# Patient Record
Sex: FEMALE | Race: WHITE | NOT HISPANIC OR LATINO | Employment: OTHER | URBAN - METROPOLITAN AREA
[De-identification: names, ages, dates, MRNs, and addresses within clinical notes are randomized per-mention and may not be internally consistent; named-entity substitution may affect disease eponyms.]

---

## 2017-12-08 ENCOUNTER — EST. PATIENT EMERGENCY (OUTPATIENT)
Dept: URBAN - METROPOLITAN AREA CLINIC 43 | Facility: CLINIC | Age: 62
End: 2017-12-08

## 2017-12-08 DIAGNOSIS — H04.123: ICD-10-CM

## 2017-12-08 DIAGNOSIS — H00.026: ICD-10-CM

## 2017-12-08 DIAGNOSIS — H00.023: ICD-10-CM

## 2017-12-08 PROCEDURE — 92012 INTRM OPH EXAM EST PATIENT: CPT

## 2017-12-08 ASSESSMENT — VISUAL ACUITY
OS_CC: 20/20
OD_CC: 20/20

## 2018-03-28 ENCOUNTER — ESTABLISHED COMPREHENSIVE EXAM (OUTPATIENT)
Dept: URBAN - METROPOLITAN AREA CLINIC 43 | Facility: CLINIC | Age: 63
End: 2018-03-28

## 2018-03-28 DIAGNOSIS — H00.023: ICD-10-CM

## 2018-03-28 DIAGNOSIS — H25.13: ICD-10-CM

## 2018-03-28 DIAGNOSIS — H00.026: ICD-10-CM

## 2018-03-28 DIAGNOSIS — H04.123: ICD-10-CM

## 2018-03-28 DIAGNOSIS — H43.813: ICD-10-CM

## 2018-03-28 PROCEDURE — 92015 DETERMINE REFRACTIVE STATE: CPT

## 2018-03-28 PROCEDURE — 92014 COMPRE OPH EXAM EST PT 1/>: CPT

## 2018-03-28 ASSESSMENT — VISUAL ACUITY
OD_CC: 20/20-1
OD_CC: J1
OS_SC: CF 6FT
OS_CC: 20/25+2
OS_CC: J1
OD_SC: 20/70+1

## 2018-03-28 ASSESSMENT — TONOMETRY
OD_IOP_MMHG: 12
OS_IOP_MMHG: 14

## 2019-08-21 ENCOUNTER — ESTABLISHED COMPREHENSIVE EXAM (OUTPATIENT)
Dept: URBAN - METROPOLITAN AREA CLINIC 43 | Facility: CLINIC | Age: 64
End: 2019-08-21

## 2019-08-21 DIAGNOSIS — H43.813: ICD-10-CM

## 2019-08-21 DIAGNOSIS — H25.9: ICD-10-CM

## 2019-08-21 DIAGNOSIS — H52.13: ICD-10-CM

## 2019-08-21 DIAGNOSIS — H04.123: ICD-10-CM

## 2019-08-21 DIAGNOSIS — H02.883: ICD-10-CM

## 2019-08-21 DIAGNOSIS — H02.886: ICD-10-CM

## 2019-08-21 PROCEDURE — 92014 COMPRE OPH EXAM EST PT 1/>: CPT

## 2019-08-21 PROCEDURE — 92015 DETERMINE REFRACTIVE STATE: CPT

## 2019-08-21 ASSESSMENT — VISUAL ACUITY
OS_CC: J1
OS_SC: 20/200
OD_CC: J1
OD_CC: 20/20
OS_SC: J1
OD_SC: J4
OD_SC: 20/70
OD_PH: 20/30
OS_PH: 20/60
OS_CC: 20/20-1

## 2019-08-21 ASSESSMENT — TONOMETRY
OS_IOP_MMHG: 13
OD_IOP_MMHG: 13

## 2020-10-07 ENCOUNTER — ESTABLISHED COMPREHENSIVE EXAM (OUTPATIENT)
Dept: URBAN - METROPOLITAN AREA CLINIC 43 | Facility: CLINIC | Age: 65
End: 2020-10-07

## 2020-10-07 DIAGNOSIS — H04.123: ICD-10-CM

## 2020-10-07 DIAGNOSIS — H02.886: ICD-10-CM

## 2020-10-07 DIAGNOSIS — H43.813: ICD-10-CM

## 2020-10-07 DIAGNOSIS — H52.13: ICD-10-CM

## 2020-10-07 DIAGNOSIS — H02.883: ICD-10-CM

## 2020-10-07 DIAGNOSIS — H25.9: ICD-10-CM

## 2020-10-07 PROCEDURE — 92015 DETERMINE REFRACTIVE STATE: CPT

## 2020-10-07 PROCEDURE — 92014 COMPRE OPH EXAM EST PT 1/>: CPT

## 2020-10-07 ASSESSMENT — VISUAL ACUITY
OS_SC: 20/400
OD_SC: J3
OS_CC: 20/25-2
OS_SC: J1-
OD_SC: 20/60
OD_CC: 20/20-
OD_CC: J1
OS_CC: J1

## 2020-10-07 ASSESSMENT — TONOMETRY
OD_IOP_MMHG: 10
OS_IOP_MMHG: 14

## 2021-11-11 ENCOUNTER — ESTABLISHED COMPREHENSIVE EXAM (OUTPATIENT)
Dept: URBAN - METROPOLITAN AREA CLINIC 43 | Facility: CLINIC | Age: 66
End: 2021-11-11

## 2021-11-11 DIAGNOSIS — H02.883: ICD-10-CM

## 2021-11-11 DIAGNOSIS — H52.13: ICD-10-CM

## 2021-11-11 DIAGNOSIS — H04.123: ICD-10-CM

## 2021-11-11 DIAGNOSIS — H25.9: ICD-10-CM

## 2021-11-11 DIAGNOSIS — H43.813: ICD-10-CM

## 2021-11-11 DIAGNOSIS — H02.886: ICD-10-CM

## 2021-11-11 PROCEDURE — 92015 DETERMINE REFRACTIVE STATE: CPT

## 2021-11-11 PROCEDURE — 92014 COMPRE OPH EXAM EST PT 1/>: CPT

## 2021-11-11 ASSESSMENT — VISUAL ACUITY
OD_CC: J1
OS_CC: J1-
OS_CC: 20/25
OD_SC: 20/300
OD_CC: 20/20-1
OD_SC: J6-
OS_SC: J2
OS_SC: 20/300

## 2021-11-11 ASSESSMENT — TONOMETRY
OD_IOP_MMHG: 13
OS_IOP_MMHG: 13

## 2022-05-13 ENCOUNTER — FOLLOW UP (OUTPATIENT)
Dept: URBAN - METROPOLITAN AREA CLINIC 43 | Facility: CLINIC | Age: 67
End: 2022-05-13

## 2022-05-13 DIAGNOSIS — H02.883: ICD-10-CM

## 2022-05-13 DIAGNOSIS — H02.886: ICD-10-CM

## 2022-05-13 DIAGNOSIS — H04.123: ICD-10-CM

## 2022-05-13 PROCEDURE — 92012 INTRM OPH EXAM EST PATIENT: CPT

## 2022-05-13 ASSESSMENT — VISUAL ACUITY
OD_CC: J1
OS_CC: J1
OD_CC: 20/20-1
OD_SC: 20/100
OS_SC: 20/400
OS_CC: 20/25+2

## 2022-06-27 ENCOUNTER — EMERGENCY VISIT (OUTPATIENT)
Dept: URBAN - METROPOLITAN AREA CLINIC 43 | Facility: CLINIC | Age: 67
End: 2022-06-27

## 2022-06-27 DIAGNOSIS — H04.123: ICD-10-CM

## 2022-06-27 DIAGNOSIS — H02.883: ICD-10-CM

## 2022-06-27 DIAGNOSIS — H02.886: ICD-10-CM

## 2022-06-27 PROCEDURE — 92012 INTRM OPH EXAM EST PATIENT: CPT

## 2022-06-27 RX ORDER — NEOMYCIN SULFATE, POLYMYXIN B SULFATE AND DEXAMETHASONE 3.5; 10000; 1 MG/G; [USP'U]/G; MG/G: OINTMENT OPHTHALMIC EVERY EVENING

## 2022-06-27 ASSESSMENT — TONOMETRY
OS_IOP_MMHG: 17
OD_IOP_MMHG: 13

## 2022-06-27 ASSESSMENT — VISUAL ACUITY
OD_CC: 20/25-2
OS_CC: 20/25+2

## 2023-01-30 ENCOUNTER — COMPREHENSIVE EXAM (OUTPATIENT)
Dept: URBAN - METROPOLITAN AREA CLINIC 43 | Facility: CLINIC | Age: 68
End: 2023-01-30

## 2023-01-30 DIAGNOSIS — H02.886: ICD-10-CM

## 2023-01-30 DIAGNOSIS — H43.813: ICD-10-CM

## 2023-01-30 DIAGNOSIS — H52.13: ICD-10-CM

## 2023-01-30 DIAGNOSIS — H02.883: ICD-10-CM

## 2023-01-30 DIAGNOSIS — H25.9: ICD-10-CM

## 2023-01-30 DIAGNOSIS — H04.123: ICD-10-CM

## 2023-01-30 PROCEDURE — 92014 COMPRE OPH EXAM EST PT 1/>: CPT

## 2023-01-30 PROCEDURE — 92015 DETERMINE REFRACTIVE STATE: CPT

## 2023-01-30 ASSESSMENT — VISUAL ACUITY
OS_SC: 20/400
OD_SC: J2
OD_CC: 20/40
OD_SC: 20/200
OS_CC: 20/40-1
OD_CC: J1
OS_SC: J1
OS_CC: J1

## 2023-01-30 ASSESSMENT — TONOMETRY
OD_IOP_MMHG: 10
OS_IOP_MMHG: 10

## 2024-02-19 ENCOUNTER — OFFICE VISIT (OUTPATIENT)
Dept: PRIMARY CARE CLINIC | Facility: CLINIC | Age: 69
End: 2024-02-19
Payer: MEDICARE

## 2024-02-19 VITALS
TEMPERATURE: 97.7 F | SYSTOLIC BLOOD PRESSURE: 142 MMHG | BODY MASS INDEX: 29.41 KG/M2 | WEIGHT: 183 LBS | HEIGHT: 66 IN | DIASTOLIC BLOOD PRESSURE: 78 MMHG | HEART RATE: 78 BPM | OXYGEN SATURATION: 98 %

## 2024-02-19 DIAGNOSIS — I65.21 CAROTID ATHEROSCLEROSIS, RIGHT: ICD-10-CM

## 2024-02-19 DIAGNOSIS — E78.5 HYPERLIPIDEMIA, UNSPECIFIED HYPERLIPIDEMIA TYPE: ICD-10-CM

## 2024-02-19 DIAGNOSIS — Z76.89 ENCOUNTER TO ESTABLISH CARE WITH NEW DOCTOR: Primary | ICD-10-CM

## 2024-02-19 DIAGNOSIS — F41.8 DEPRESSION WITH ANXIETY: ICD-10-CM

## 2024-02-19 DIAGNOSIS — E03.9 HYPOTHYROIDISM, UNSPECIFIED TYPE: ICD-10-CM

## 2024-02-19 DIAGNOSIS — I25.10 CORONARY ARTERY DISEASE INVOLVING NATIVE CORONARY ARTERY OF NATIVE HEART WITHOUT ANGINA PECTORIS: ICD-10-CM

## 2024-02-19 DIAGNOSIS — K20.90 ESOPHAGITIS: ICD-10-CM

## 2024-02-19 DIAGNOSIS — M70.72 BURSITIS OF BOTH HIPS, UNSPECIFIED BURSA: ICD-10-CM

## 2024-02-19 DIAGNOSIS — M77.8 TENDINITIS OF SHOULDER, UNSPECIFIED LATERALITY: ICD-10-CM

## 2024-02-19 DIAGNOSIS — M70.71 BURSITIS OF BOTH HIPS, UNSPECIFIED BURSA: ICD-10-CM

## 2024-02-19 PROBLEM — M54.50 LUMBAGO: Status: ACTIVE | Noted: 2019-07-16

## 2024-02-19 PROBLEM — R91.8 MULTIPLE PULMONARY NODULES DETERMINED BY COMPUTED TOMOGRAPHY OF LUNG: Status: ACTIVE | Noted: 2024-02-19

## 2024-02-19 PROBLEM — M54.2 CERVICALGIA: Status: ACTIVE | Noted: 2019-07-16

## 2024-02-19 PROBLEM — F41.9 ANXIETY: Status: ACTIVE | Noted: 2024-02-19

## 2024-02-19 PROBLEM — G47.30 SLEEP APNEA: Status: ACTIVE | Noted: 2024-02-19

## 2024-02-19 PROBLEM — I65.29 CAROTID ATHEROSCLEROSIS: Status: ACTIVE | Noted: 2019-02-27

## 2024-02-19 PROCEDURE — G8427 DOCREV CUR MEDS BY ELIG CLIN: HCPCS | Performed by: FAMILY MEDICINE

## 2024-02-19 PROCEDURE — 1123F ACP DISCUSS/DSCN MKR DOCD: CPT | Performed by: FAMILY MEDICINE

## 2024-02-19 PROCEDURE — G8419 CALC BMI OUT NRM PARAM NOF/U: HCPCS | Performed by: FAMILY MEDICINE

## 2024-02-19 PROCEDURE — G8484 FLU IMMUNIZE NO ADMIN: HCPCS | Performed by: FAMILY MEDICINE

## 2024-02-19 PROCEDURE — 3017F COLORECTAL CA SCREEN DOC REV: CPT | Performed by: FAMILY MEDICINE

## 2024-02-19 PROCEDURE — 1090F PRES/ABSN URINE INCON ASSESS: CPT | Performed by: FAMILY MEDICINE

## 2024-02-19 PROCEDURE — 1036F TOBACCO NON-USER: CPT | Performed by: FAMILY MEDICINE

## 2024-02-19 PROCEDURE — G8400 PT W/DXA NO RESULTS DOC: HCPCS | Performed by: FAMILY MEDICINE

## 2024-02-19 PROCEDURE — 99204 OFFICE O/P NEW MOD 45 MIN: CPT | Performed by: FAMILY MEDICINE

## 2024-02-19 RX ORDER — EPINEPHRINE 0.3 MG/.3ML
INJECTION SUBCUTANEOUS
COMMUNITY
Start: 2024-02-09

## 2024-02-19 RX ORDER — FAMOTIDINE 10 MG
TABLET ORAL
COMMUNITY

## 2024-02-19 RX ORDER — ALPRAZOLAM 0.25 MG/1
TABLET ORAL
COMMUNITY
Start: 2023-11-27

## 2024-02-19 RX ORDER — PRAVASTATIN SODIUM 10 MG
TABLET ORAL
COMMUNITY
Start: 2024-01-15

## 2024-02-19 RX ORDER — LEVOTHYROXINE SODIUM 88 UG/1
88 TABLET ORAL DAILY
COMMUNITY
Start: 2024-01-19

## 2024-02-19 RX ORDER — VITAMIN B COMPLEX
TABLET ORAL
COMMUNITY

## 2024-02-19 ASSESSMENT — PATIENT HEALTH QUESTIONNAIRE - PHQ9
2. FEELING DOWN, DEPRESSED OR HOPELESS: 0
SUM OF ALL RESPONSES TO PHQ QUESTIONS 1-9: 0
1. LITTLE INTEREST OR PLEASURE IN DOING THINGS: 0
SUM OF ALL RESPONSES TO PHQ9 QUESTIONS 1 & 2: 0

## 2024-02-19 ASSESSMENT — ENCOUNTER SYMPTOMS
COUGH: 0
NAUSEA: 0
SHORTNESS OF BREATH: 0
DIARRHEA: 0
ABDOMINAL PAIN: 0
VOMITING: 0

## 2024-02-19 NOTE — PROGRESS NOTES
review records, will refer to Cardiology and repeat labs in 3 months.         Relevant Medications    EPINEPHrine (EPIPEN) 0.3 MG/0.3ML SOAJ injection    pravastatin (PRAVACHOL) 10 MG tablet    Other Relevant Orders    [facility] [place]        The diagnoses and plan were discussed with the patient, who verbalizes understanding and agrees with plan.  All questions answered.    Chief Complaint    Chief Complaint   Patient presents with    New Patient     Establishing care. Patient has just relocated from [place].        HISTORY OF PRESENT ILLNESS    68 y.o. female presents today to establish care with a new primary care provider. Just relocated from [place], [place] 3 weeks ago.  Has had multiple issues with joint pains, sciatica, back issues and bursitis in her hips.  Had injections in both hips before moving here but notes that it has not completely resolved.  Does feel better than before.  Was starting PT at the same time.  Was also doing PT for tendinitis in both shoulders. Got bronchitis a couple of years ago and was sick for seven weeks.  States that she just restarted Pravastatin about a month ago after failing multiple other medications, including injections.  Has 20 mg and 10 mg, states that she alternates the doses due to muscle aches. Has a history of constipation requiring significant Miralax use, states that she has been using it daily but does not think it is working well right now, dexter snot feel like she is clearing out her bowels.  States that she is eating prunes and cut back on her alcohol and carbonation due to a history of reflux.  Has been eating healthier and walking more, a couple of days per week.    PAST MEDICAL HISTORY    Past Medical History:   Diagnosis Date    Bursitis of both hips     CAD (coronary artery disease)     Chronic back pain     Hyperlipidemia     Hypothyroidism     Muscle spasm     Neuropathy     Sciatic nerve disease, unspecified laterality     R>L

## 2024-02-19 NOTE — PATIENT INSTRUCTIONS
IT WAS GREAT TO SEE YOU TODAY!    I HAVE REFERRED YOU TO ORTHOPEDICS, PHYSICAL THERAPY AND CARDIOLOGY.  THEY WILL CALL YOU TO SET UP THESE APPOINTMENTS.     WE WILL DO LABS WHEN YOU RETURN IN 3 MONTHS SO PLEASE COME FASTING (NOTHING TO EAT OR DRINK AFTER MIDNIGHT THE NIGHT BEFORE EXCEPT YOUR MEDS AND WATER).    PLEASE TAKE ALL MEDICATION AS DISCUSSED.    ~LOOK FOR BERBERINE AND MAGNESIUM TO TAKE.  THESE ARE SUPPLEMENTS YOU CAN TAKE AT NIGHT TO HELP WITH CONSTIPATION.  USUALLY I RECOMMEND AROUND 100-200 MG OF MAGNESIUM AT NIGHT.  LOOK FOR 1000 MG OF BERBERINE TO HELP WITH BOTH YOUR CHOLESTEROL AND WITH CONSTIPATION.    I WILL SEE YOU AGAIN IN 3 MONTHS BUT PLEASE CALL WITH CONCERNS [phone]

## 2024-02-19 NOTE — ASSESSMENT & PLAN NOTE
Alternate 10 mg and 20 mg of Pravastatin due to significant side effects from multiple medications in the past.  Will review records, will refer to Cardiology and repeat labs in 3 months.

## 2024-02-26 ENCOUNTER — HOSPITAL ENCOUNTER (OUTPATIENT)
Dept: PHYSICAL THERAPY | Age: 69
Setting detail: RECURRING SERIES
Discharge: HOME | End: 2024-02-29
Attending: FAMILY MEDICINE
Payer: MEDICARE

## 2024-02-26 DIAGNOSIS — M25.512 LEFT SHOULDER PAIN, UNSPECIFIED CHRONICITY: ICD-10-CM

## 2024-02-26 DIAGNOSIS — M25.511 RIGHT SHOULDER PAIN, UNSPECIFIED CHRONICITY: Primary | ICD-10-CM

## 2024-02-26 PROCEDURE — 97035 APP MDLTY 1+ULTRASOUND EA 15: CPT

## 2024-02-26 PROCEDURE — 97140 MANUAL THERAPY 1/> REGIONS: CPT

## 2024-02-26 PROCEDURE — 97161 PT EVAL LOW COMPLEX 20 MIN: CPT

## 2024-02-26 ASSESSMENT — PAIN DESCRIPTION - DESCRIPTORS: DESCRIPTORS: ACHING;SHARP

## 2024-02-26 ASSESSMENT — PAIN SCALES - GENERAL: PAINLEVEL_OUTOF10: 7

## 2024-02-26 ASSESSMENT — PAIN DESCRIPTION - LOCATION: LOCATION: SHOULDER

## 2024-02-26 ASSESSMENT — PAIN DESCRIPTION - PAIN TYPE: TYPE: CHRONIC PAIN

## 2024-02-26 ASSESSMENT — PAIN DESCRIPTION - ORIENTATION: ORIENTATION: RIGHT

## 2024-02-26 NOTE — PROGRESS NOTES
Candi Becerra  : 1955  Primary: Medicare Part A And B (Medicare)  Secondary: [employer] HE[employer] MEDICARE SUPP [facility] @ [address]  [facility] [address]  Phone: [phone]  Fax: [phone] Plan Frequency: 2 times a week for 90 days    Plan of Care/Certification Expiration Date: 24        Plan of Care/Certification Expiration Date:  Plan of Care/Certification Expiration Date: 24    Frequency/Duration:   Plan Frequency: 2 times a week for 90 days      Time In/Out:   Time In: 1348  Time Out: 1430      PT Visit Info:    Progress Note Due Date: 24  Total # of Visits to Date: 1  Progress Note Counter: 1      Visit Count:  1    OUTPATIENT PHYSICAL THERAPY:   Treatment Note 2024       Episode  (PT- Bursitis of both hips/tendinitis of shoulder)               Treatment Diagnosis:    Right shoulder pain, unspecified chronicity  Left shoulder pain, unspecified chronicity  Medical/Referring Diagnosis:    Bursitis of both hips, unspecified bursa  Tendinitis of shoulder, unspecified laterality  Referring Physician:  Lata Evans DO MD Orders:  PT Eval and Treat   Return MD Appt:  Unknown  Date of Onset:  Onset Date:  (Chronic)   Allergies:   Iodinated contrast media and Statins  Restrictions/Precautions:   None      Interventions Planned (Treatment may consist of any combination of the following):     See Assessment Note    Subjective Comments:   Patient complains of right shoulder pain.  Initial Pain Level:: Right Shoulder 7/10  Post Session Pain Level:  Right  Shoulder 7/10  Medications Last Reviewed:  2024  Updated Objective Findings:  See Evaluation Note from today  Treatment   MANUAL THERAPY: (15 minutes):   Joint mobilization was utilized and necessary because of the patient's restricted joint motion and painful spasm. In supine, patient received range of motion/mobilization to right shoulder in all planes.   MODALITIES:

## 2024-02-26 NOTE — THERAPY EVALUATION
degrees with pain, abduction 82 degrees with pain, external rotation 56 degrees with pain.  Patient is able to reach behind her back to mid thoracic level with left upper extremity and mid gluteal level with right upper extremity.  Right shoulder PROM is as follows: flexion 140 degrees with pain, abduction 90 degrees with pain, external rotation 65 degrees with pain.   Sensation:   Bilateral upper extremity within normal limits.   ASSESSMENT   Initial Assessment:  Patient presents with increased pain, decreased strength, and decreased range of motion.  Patient would benefit from skilled physical therapy to address problems and goals.  Thank you for this referral.   Therapy Problem List: (Impacting functional limitations):    Increased Pain, Decreased Strength, Decreased ROM, Decreased [place] with Home Exercise Program, Decreased Posture, Difficulty Sleeping, and Decreased Activity Tolerance/Endurance*   Therapy Prognosis:   Excellent     Initial Assessment Complexity:   Low Complexity       PLAN   Effective Dates: 2/26/2024 TO Plan of Care/Certification Expiration Date: 05/26/24     Frequency/Duration: Plan Frequency: 2 times a week for 90 days      Interventions Planned (Treatment may consist of any combination of the following):    Home Exercise Program (HEP), Manual Therapy, Modalities:,   Heat/Cold,   Ultrasound, and   E-stim - unattended, Range of Motion (ROM), Therapeutic Activites, and Therapeutic Exercise/Strengthening   Goals: (Goals have been discussed and agreed upon with patient.)  Short-Term Functional Goals: Time Frame: 30 days   Patient will be independent with home exercise program to improve strength and decrease pain.  Patient with improve right active shoulder flexion by 20 degrees to improve ease with mobility.   Discharge Goals: Time Frame: 90 days   Patient will score less than or equal to 14 on DASH indicating improvement.  Patient will increase right shoulder strength greater than or

## 2024-03-01 ENCOUNTER — HOSPITAL ENCOUNTER (OUTPATIENT)
Dept: PHYSICAL THERAPY | Age: 69
Setting detail: RECURRING SERIES
Discharge: HOME | End: 2024-03-04
Attending: FAMILY MEDICINE
Payer: MEDICARE

## 2024-03-01 PROCEDURE — 97140 MANUAL THERAPY 1/> REGIONS: CPT

## 2024-03-01 PROCEDURE — 97035 APP MDLTY 1+ULTRASOUND EA 15: CPT

## 2024-03-01 PROCEDURE — 97110 THERAPEUTIC EXERCISES: CPT

## 2024-03-01 ASSESSMENT — PAIN SCALES - GENERAL: PAINLEVEL_OUTOF10: 6

## 2024-03-01 NOTE — PROGRESS NOTES
Candi Becerra  : 1955  Primary: Medicare Part A And B (Medicare)  Secondary: [employer] HE[employer] C[employer] MEDICARE SUPP [facility] @ [address]  [facility] [address]  Phone: [phone]  Fax: [phone] Plan Frequency: 2 times a week for 90 days    Plan of Care/Certification Expiration Date: 24        Plan of Care/Certification Expiration Date:  Plan of Care/Certification Expiration Date: 24    Frequency/Duration:   Plan Frequency: 2 times a week for 90 days      Time In/Out:   Time In: 1515  Time Out: 1600      PT Visit Info:    Progress Note Due Date: 24  Total # of Visits to Date: 2  Progress Note Counter: 2      Visit Count:  2    OUTPATIENT PHYSICAL THERAPY:   Treatment Note 3/1/2024       Episode  (PT- Bursitis of both hips/tendinitis of shoulder)               Treatment Diagnosis:    Right shoulder pain, unspecified chronicity  Left shoulder pain, unspecified chronicity  Medical/Referring Diagnosis:    Bursitis of both hips, unspecified bursa  Tendinitis of shoulder, unspecified laterality  Referring Physician:  Lata Evans DO MD Orders:  PT Eval and Treat   Return MD Appt:  Unknown  Date of Onset:  Onset Date:  (Chronic)   Allergies:   Iodinated contrast media and Statins  Restrictions/Precautions:   None      Interventions Planned (Treatment may consist of any combination of the following):     See Assessment Note    Subjective Comments: Patient reports her shoulder is hurting today.   Initial Pain Level::     6/10  Post Session Pain Level:       6/10  Medications Last Reviewed:  3/1/2024  Updated Objective Findings:  None Today  Treatment   MANUAL THERAPY: (10 minutes):   Joint mobilization was utilized and necessary because of the patient's restricted joint motion and painful spasm. In supine, patient received range of motion/mobilization to right shoulder in all planes.   Trigger point release along bilateral upper

## 2024-03-06 ENCOUNTER — HOSPITAL ENCOUNTER (OUTPATIENT)
Dept: PHYSICAL THERAPY | Age: 69
Setting detail: RECURRING SERIES
Discharge: HOME | End: 2024-03-09
Attending: FAMILY MEDICINE
Payer: MEDICARE

## 2024-03-06 PROCEDURE — 97035 APP MDLTY 1+ULTRASOUND EA 15: CPT

## 2024-03-06 PROCEDURE — 97110 THERAPEUTIC EXERCISES: CPT

## 2024-03-06 PROCEDURE — 97140 MANUAL THERAPY 1/> REGIONS: CPT

## 2024-03-06 ASSESSMENT — PAIN SCALES - GENERAL: PAINLEVEL_OUTOF10: 6

## 2024-03-06 NOTE — PROGRESS NOTES
Candi Becerra  : 1955  Primary: Medicare Part A And B (Medicare)  Secondary: [employer] HE[employer] C[employer] MEDICARE SUPP [facility] @ [address]  [facility] [address]  Phone: [phone]  Fax: [phone] Plan Frequency: 2 times a week for 90 days    Plan of Care/Certification Expiration Date: 24        Plan of Care/Certification Expiration Date:  Plan of Care/Certification Expiration Date: 24    Frequency/Duration:   Plan Frequency: 2 times a week for 90 days      Time In/Out:   Time In: 1430  Time Out: 1515      PT Visit Info:    Progress Note Due Date: 24  Total # of Visits to Date: 2  Progress Note Counter: 2      Visit Count:  3    OUTPATIENT PHYSICAL THERAPY:   Treatment Note 3/6/2024       Episode  (PT- Bursitis of both hips/tendinitis of shoulder)               Treatment Diagnosis:    Right shoulder pain, unspecified chronicity  Left shoulder pain, unspecified chronicity  Medical/Referring Diagnosis:    Bursitis of both hips, unspecified bursa  Tendinitis of shoulder, unspecified laterality  Referring Physician:  Lata Evans DO MD Orders:  PT Eval and Treat   Return MD Appt:  Unknown  Date of Onset:  Onset Date:  (Chronic)   Allergies:   Iodinated contrast media and Statins  Restrictions/Precautions:   None      Interventions Planned (Treatment may consist of any combination of the following):     See Assessment Note    Subjective Comments: \"I took the day off of exercises yesterday to rest\"  Initial Pain Level::     6/10  Post Session Pain Level:       6/10  Medications Last Reviewed:  3/6/2024  Updated Objective Findings:  None Today  Treatment   MANUAL THERAPY: (10 minutes):   Joint mobilization was utilized and necessary because of the patient's restricted joint motion and painful spasm. In supine, patient received range of motion/mobilization to right shoulder in all planes.   Trigger point release along bilateral upper

## 2024-03-08 ENCOUNTER — HOSPITAL ENCOUNTER (OUTPATIENT)
Dept: PHYSICAL THERAPY | Age: 69
Setting detail: RECURRING SERIES
Discharge: HOME | End: 2024-03-11
Attending: FAMILY MEDICINE
Payer: MEDICARE

## 2024-03-08 PROCEDURE — 97035 APP MDLTY 1+ULTRASOUND EA 15: CPT

## 2024-03-08 PROCEDURE — 97140 MANUAL THERAPY 1/> REGIONS: CPT

## 2024-03-08 PROCEDURE — 97110 THERAPEUTIC EXERCISES: CPT

## 2024-03-08 ASSESSMENT — PAIN SCALES - GENERAL: PAINLEVEL_OUTOF10: 6

## 2024-03-08 NOTE — PROGRESS NOTES
Candi Becerra  : 1955  Primary: Medicare Part A And B (Medicare)  Secondary: [employer] HE[employer] C[employer] MEDICARE SUPP [facility] @ [address]  [facility] [address]  Phone: [phone]  Fax: [phone] Plan Frequency: 2 times a week for 90 days    Plan of Care/Certification Expiration Date: 24        Plan of Care/Certification Expiration Date:  Plan of Care/Certification Expiration Date: 24    Frequency/Duration:   Plan Frequency: 2 times a week for 90 days      Time In/Out:   Time In: 1520  Time Out: 1605      PT Visit Info:    Progress Note Due Date: 24  Total # of Visits to Date: 4  Progress Note Counter: 4      Visit Count:  4    OUTPATIENT PHYSICAL THERAPY:   Treatment Note 3/8/2024       Episode  (PT- Bursitis of both hips/tendinitis of shoulder)               Treatment Diagnosis:    Right shoulder pain, unspecified chronicity  Left shoulder pain, unspecified chronicity  Medical/Referring Diagnosis:    Bursitis of both hips, unspecified bursa  Tendinitis of shoulder, unspecified laterality  Referring Physician:  Lata Evans DO MD Orders:  PT Eval and Treat   Return MD Appt:  Unknown  Date of Onset:  Onset Date:  (Chronic)   Allergies:   Iodinated contrast media and Statins  Restrictions/Precautions:   None      Interventions Planned (Treatment may consist of any combination of the following):     See Assessment Note    Subjective Comments: \"The pain is not to bad\".  Initial Pain Level::     6/10  Post Session Pain Level:       6/10  Medications Last Reviewed:  3/8/2024  Updated Objective Findings:  None Today  Treatment   MANUAL THERAPY: (15 minutes):   Joint mobilization was utilized and necessary because of the patient's restricted joint motion and painful spasm. In supine, patient received range of motion/mobilization to right shoulder in all planes.   Trigger point release along bilateral upper traps/bilateral scapular region.

## 2024-03-14 ENCOUNTER — HOSPITAL ENCOUNTER (OUTPATIENT)
Dept: PHYSICAL THERAPY | Age: 69
Setting detail: RECURRING SERIES
Discharge: HOME | End: 2024-03-17
Attending: FAMILY MEDICINE
Payer: MEDICARE

## 2024-03-14 PROCEDURE — 97140 MANUAL THERAPY 1/> REGIONS: CPT

## 2024-03-14 PROCEDURE — 97110 THERAPEUTIC EXERCISES: CPT

## 2024-03-14 PROCEDURE — 97035 APP MDLTY 1+ULTRASOUND EA 15: CPT

## 2024-03-14 ASSESSMENT — PAIN SCALES - GENERAL: PAINLEVEL_OUTOF10: 6

## 2024-03-14 NOTE — PROGRESS NOTES
Candi Brownla  : 1955  Primary: Medicare Part A And B (Medicare)  Secondary: [employer] HE[employer] C[employer] MEDICARE SUPP [facility] @ [address]  [facility] [address]  Phone: [phone]  Fax: [phone] Plan Frequency: 2 times a week for 90 days    Plan of Care/Certification Expiration Date: 24        Plan of Care/Certification Expiration Date:  Plan of Care/Certification Expiration Date: 24    Frequency/Duration:   Plan Frequency: 2 times a week for 90 days      Time In/Out:   Time In: 1345  Time Out: 1430      PT Visit Info:    Progress Note Due Date: 24  Total # of Visits to Date: 5  Progress Note Counter: 5      Visit Count:  5    OUTPATIENT PHYSICAL THERAPY:   Treatment Note 3/14/2024       Episode  (PT- Bursitis of both hips/tendinitis of shoulder)               Treatment Diagnosis:    Right shoulder pain, unspecified chronicity  Left shoulder pain, unspecified chronicity  Medical/Referring Diagnosis:    Bursitis of both hips, unspecified bursa  Tendinitis of shoulder, unspecified laterality  Referring Physician:  Lata Evans DO MD Orders:  PT Eval and Treat   Return MD Appt:  Unknown  Date of Onset:  Onset Date:  (Chronic)   Allergies:   Iodinated contrast media and Statins  Restrictions/Precautions:   None      Interventions Planned (Treatment may consist of any combination of the following):     See Assessment Note    Subjective Comments: Patient reports she spent 13 minutes cutting the grass with a self propel lawnmower and it aggravated both shoulders.  \"I fell going to the bathroom-tripped over something yesterday\".  Initial Pain Level::     6/10  Post Session Pain Level:       6/10  Medications Last Reviewed:  3/14/2024  Updated Objective Findings:  None Today  Treatment   MANUAL THERAPY: (15 minutes):   Joint mobilization was utilized and necessary because of the patient's restricted joint motion and painful spasm. In supine,

## 2024-03-18 ENCOUNTER — INITIAL CONSULT (OUTPATIENT)
Age: 69
End: 2024-03-18
Payer: MEDICARE

## 2024-03-18 VITALS
BODY MASS INDEX: 29.18 KG/M2 | HEART RATE: 62 BPM | SYSTOLIC BLOOD PRESSURE: 122 MMHG | WEIGHT: 180.8 LBS | DIASTOLIC BLOOD PRESSURE: 84 MMHG

## 2024-03-18 DIAGNOSIS — I25.10 CORONARY ARTERY DISEASE INVOLVING NATIVE CORONARY ARTERY OF NATIVE HEART WITHOUT ANGINA PECTORIS: Primary | ICD-10-CM

## 2024-03-18 DIAGNOSIS — Z76.89 ENCOUNTER TO ESTABLISH CARE: ICD-10-CM

## 2024-03-18 PROCEDURE — G8419 CALC BMI OUT NRM PARAM NOF/U: HCPCS | Performed by: INTERNAL MEDICINE

## 2024-03-18 PROCEDURE — 3017F COLORECTAL CA SCREEN DOC REV: CPT | Performed by: INTERNAL MEDICINE

## 2024-03-18 PROCEDURE — G8427 DOCREV CUR MEDS BY ELIG CLIN: HCPCS | Performed by: INTERNAL MEDICINE

## 2024-03-18 PROCEDURE — 1123F ACP DISCUSS/DSCN MKR DOCD: CPT | Performed by: INTERNAL MEDICINE

## 2024-03-18 PROCEDURE — G8484 FLU IMMUNIZE NO ADMIN: HCPCS | Performed by: INTERNAL MEDICINE

## 2024-03-18 PROCEDURE — G8400 PT W/DXA NO RESULTS DOC: HCPCS | Performed by: INTERNAL MEDICINE

## 2024-03-18 PROCEDURE — 1090F PRES/ABSN URINE INCON ASSESS: CPT | Performed by: INTERNAL MEDICINE

## 2024-03-18 PROCEDURE — 99214 OFFICE O/P EST MOD 30 MIN: CPT | Performed by: INTERNAL MEDICINE

## 2024-03-18 PROCEDURE — 93000 ELECTROCARDIOGRAM COMPLETE: CPT | Performed by: INTERNAL MEDICINE

## 2024-03-18 PROCEDURE — 1036F TOBACCO NON-USER: CPT | Performed by: INTERNAL MEDICINE

## 2024-03-18 ASSESSMENT — ENCOUNTER SYMPTOMS
SHORTNESS OF BREATH: 0
ABDOMINAL PAIN: 0

## 2024-03-18 NOTE — PROGRESS NOTES
Cardiovascular:         As per the HPI   Respiratory:  Negative for shortness of breath.    Hematologic/Lymphatic: Does not bruise/bleed easily.   Gastrointestinal:  Negative for abdominal pain.   Genitourinary:  Negative for dysuria.   Neurological:  Negative for focal weakness.   Psychiatric/Behavioral:  Negative for suicidal ideas.           PHYSICAL EXAM:   /84   Pulse 62   Wt 82 kg (180 lb 12.8 oz)   BMI 29.18 kg/m²      Physical Exam  Vitals reviewed.   Constitutional:       Appearance: Normal appearance.      Comments: Appears younger than stated age   HENT:      Head: Normocephalic and atraumatic.   Eyes:      General: No scleral icterus.  Neck:      Vascular: No carotid bruit.   Cardiovascular:      Rate and Rhythm: Normal rate and regular rhythm.      Heart sounds: No murmur heard.     No gallop.   Pulmonary:      Breath sounds: Normal breath sounds.   Abdominal:      Palpations: Abdomen is soft.   Musculoskeletal:         General: No swelling.      Cervical back: Neck supple.   Skin:     General: Skin is warm and dry.   Neurological:      Mental Status: She is alert and oriented to person, place, and time.   Psychiatric:         Mood and Affect: Mood normal.         Medical problems and test results were reviewed with the patient today.     DATA REVIEW    BMP  No results found for: \"NA\", \"K\", \"CL\", \"CO2\", \"BUN\", \"CREATININE\", \"GLUCOSE\", \"CALCIUM\"     LIPIDS  No results found for: \"CHOL\"  No results found for: \"TRIG\"  No results found for: \"HDL\"  No results found for: \"LDLCHOLESTEROL\", \"LDLCALC\"  No results found for: \"VLDL\"  No results found for: \"CHOLHDLRATIO\"    EKG  NSR        OUTSIDE RECORDS REVIEW    Records from outside providers have been reviewed and summarized as noted in the HPI, past history and data review sections of this note, and reviewed with patient. .       ASSESSMENT and PLAN    Candi was seen today for hyperlipidemia, new patient, consultation and establish

## 2024-03-19 ENCOUNTER — HOSPITAL ENCOUNTER (OUTPATIENT)
Dept: PHYSICAL THERAPY | Age: 69
Setting detail: RECURRING SERIES
Discharge: HOME | End: 2024-03-22
Attending: FAMILY MEDICINE
Payer: MEDICARE

## 2024-03-19 PROCEDURE — 97035 APP MDLTY 1+ULTRASOUND EA 15: CPT

## 2024-03-19 PROCEDURE — 97110 THERAPEUTIC EXERCISES: CPT

## 2024-03-19 PROCEDURE — 97140 MANUAL THERAPY 1/> REGIONS: CPT

## 2024-03-19 ASSESSMENT — PAIN SCALES - GENERAL: PAINLEVEL_OUTOF10: 7

## 2024-03-19 NOTE — PROGRESS NOTES
Candi Becerra  : 1955  Primary: Medicare Part A And B (Medicare)  Secondary: [employer] HE[employer] C[employer] MEDICARE SUPP [facility] @ [address]  [facility] [address]  Phone: [phone]  Fax: [phone] Plan Frequency: 2 times a week for 90 days    Plan of Care/Certification Expiration Date: 24        Plan of Care/Certification Expiration Date:  Plan of Care/Certification Expiration Date: 24    Frequency/Duration:   Plan Frequency: 2 times a week for 90 days      Time In/Out:   Time In: 1100  Time Out: 1145      PT Visit Info:    Progress Note Due Date: 24  Total # of Visits to Date: 6  Progress Note Counter: 6      Visit Count:  6    OUTPATIENT PHYSICAL THERAPY:   Treatment Note 3/19/2024       Episode  (PT- Bursitis of both hips/tendinitis of shoulder)               Treatment Diagnosis:    Right shoulder pain, unspecified chronicity  Left shoulder pain, unspecified chronicity  Medical/Referring Diagnosis:    Bursitis of both hips, unspecified bursa  Tendinitis of shoulder, unspecified laterality  Referring Physician:  Lata Evans DO MD Orders:  PT Eval and Treat   Return MD Appt:  Unknown  Date of Onset:  Onset Date:  (Chronic)   Allergies:   Iodinated contrast media and Statins  Restrictions/Precautions:   None      Interventions Planned (Treatment may consist of any combination of the following):     See Assessment Note    Subjective Comments: Patient reports she had a hard time sleeping last night because of the pain.  \"It is not getting better\".  Initial Pain Level::     7/10  Post Session Pain Level:       7/10  Medications Last Reviewed:  3/19/2024  Updated Objective Findings:  None Today  Treatment   MANUAL THERAPY: (15 minutes):   Joint mobilization was utilized and necessary because of the patient's restricted joint motion and painful spasm. In supine, patient received range of motion/mobilization to right shoulder in all

## 2024-03-20 ENCOUNTER — OFFICE VISIT (OUTPATIENT)
Dept: ORTHOPEDIC SURGERY | Age: 69
End: 2024-03-20
Payer: MEDICARE

## 2024-03-20 VITALS — HEIGHT: 66 IN | WEIGHT: 181.8 LBS | BODY MASS INDEX: 29.22 KG/M2

## 2024-03-20 DIAGNOSIS — M25.552 HIP PAIN, LEFT: Primary | ICD-10-CM

## 2024-03-20 DIAGNOSIS — M54.50 CHRONIC BILATERAL LOW BACK PAIN, UNSPECIFIED WHETHER SCIATICA PRESENT: ICD-10-CM

## 2024-03-20 DIAGNOSIS — M25.551 HIP PAIN, RIGHT: ICD-10-CM

## 2024-03-20 DIAGNOSIS — G89.29 CHRONIC BILATERAL LOW BACK PAIN, UNSPECIFIED WHETHER SCIATICA PRESENT: ICD-10-CM

## 2024-03-20 PROCEDURE — 99204 OFFICE O/P NEW MOD 45 MIN: CPT | Performed by: ORTHOPAEDIC SURGERY

## 2024-03-20 PROCEDURE — G8428 CUR MEDS NOT DOCUMENT: HCPCS | Performed by: ORTHOPAEDIC SURGERY

## 2024-03-20 PROCEDURE — G8419 CALC BMI OUT NRM PARAM NOF/U: HCPCS | Performed by: ORTHOPAEDIC SURGERY

## 2024-03-20 PROCEDURE — 1123F ACP DISCUSS/DSCN MKR DOCD: CPT | Performed by: ORTHOPAEDIC SURGERY

## 2024-03-20 PROCEDURE — 3017F COLORECTAL CA SCREEN DOC REV: CPT | Performed by: ORTHOPAEDIC SURGERY

## 2024-03-20 PROCEDURE — 1036F TOBACCO NON-USER: CPT | Performed by: ORTHOPAEDIC SURGERY

## 2024-03-20 PROCEDURE — G8484 FLU IMMUNIZE NO ADMIN: HCPCS | Performed by: ORTHOPAEDIC SURGERY

## 2024-03-20 PROCEDURE — 1090F PRES/ABSN URINE INCON ASSESS: CPT | Performed by: ORTHOPAEDIC SURGERY

## 2024-03-20 PROCEDURE — G8400 PT W/DXA NO RESULTS DOC: HCPCS | Performed by: ORTHOPAEDIC SURGERY

## 2024-03-20 RX ORDER — METHOCARBAMOL 500 MG/1
750 TABLET, FILM COATED ORAL 4 TIMES DAILY
Qty: 30 TABLET | Refills: 0 | Status: SHIPPED | OUTPATIENT
Start: 2024-03-20 | End: 2024-03-30

## 2024-03-20 RX ORDER — DICLOFENAC SODIUM 75 MG/1
75 TABLET, DELAYED RELEASE ORAL 2 TIMES DAILY
Qty: 60 TABLET | Refills: 1 | Status: SHIPPED | OUTPATIENT
Start: 2024-03-20

## 2024-03-20 NOTE — PROGRESS NOTES
I will provide her with some diclofenac and Robaxin for intermittent discomfort.    Plan:       Follow up:   Return for [facility] for PAin eval.     LAISHA WHITTEN MD        Elements of this note have been dictated using speech recognition software. As a result, errors of speech recognition may have occurred.

## 2024-03-21 ENCOUNTER — HOSPITAL ENCOUNTER (OUTPATIENT)
Dept: PHYSICAL THERAPY | Age: 69
Setting detail: RECURRING SERIES
Discharge: HOME | End: 2024-03-24
Attending: FAMILY MEDICINE
Payer: MEDICARE

## 2024-03-21 PROCEDURE — 97110 THERAPEUTIC EXERCISES: CPT

## 2024-03-21 PROCEDURE — 97035 APP MDLTY 1+ULTRASOUND EA 15: CPT

## 2024-03-21 PROCEDURE — 97140 MANUAL THERAPY 1/> REGIONS: CPT

## 2024-03-21 ASSESSMENT — PAIN SCALES - GENERAL: PAINLEVEL_OUTOF10: 7

## 2024-03-21 NOTE — PROGRESS NOTES
Candi Becerra  : 1955  Primary: Medicare Part A And B (Medicare)  Secondary: [employer]ARP HE[employer] C[employer] MEDICARE SUPP [facility] @ [address]  [facility] [address]  Phone: [phone]  Fax: [phone] Plan Frequency: 2 times a week for 90 days    Plan of Care/Certification Expiration Date: 24        Plan of Care/Certification Expiration Date:  Plan of Care/Certification Expiration Date: 24    Frequency/Duration:   Plan Frequency: 2 times a week for 90 days      Time In/Out:   Time In: 1100  Time Out: 1140      PT Visit Info:    Progress Note Due Date: 24  Total # of Visits to Date: 7  Progress Note Counter: 7      Visit Count:  7    OUTPATIENT PHYSICAL THERAPY:   Treatment Note 3/21/2024       Episode  (PT- Bursitis of both hips/tendinitis of shoulder)               Treatment Diagnosis:    Right shoulder pain, unspecified chronicity  Left shoulder pain, unspecified chronicity  Medical/Referring Diagnosis:    Bursitis of both hips, unspecified bursa  Tendinitis of shoulder, unspecified laterality  Referring Physician:  Lata Evans DO MD Orders:  PT Eval and Treat   Return MD Appt:  Unknown  Date of Onset:  Onset Date:  (Chronic)   Allergies:   Iodinated contrast media and Statins  Restrictions/Precautions:   None      Interventions Planned (Treatment may consist of any combination of the following):     See Assessment Note    Subjective Comments: \"It is not helping, it is really painful still\"  'I went to the hip MD, the hips look great, he is sending me to pain MD, he thinks it is coming from the back\"  \"I am in pain everyday\"  Initial Pain Level::     7/10  Post Session Pain Level:       7/10  Medications Last Reviewed:  3/21/2024  Updated Objective Findings:  None Today  Treatment   MANUAL THERAPY: (15 minutes):   Joint mobilization was utilized and necessary because of the patient's restricted joint motion and painful spasm. In

## 2024-03-23 ENCOUNTER — PATIENT MESSAGE (OUTPATIENT)
Dept: PRIMARY CARE CLINIC | Facility: CLINIC | Age: 69
End: 2024-03-23

## 2024-03-23 DIAGNOSIS — Z76.89 ENCOUNTER TO ESTABLISH CARE WITH NEW DOCTOR: Primary | ICD-10-CM

## 2024-03-25 ENCOUNTER — HOSPITAL ENCOUNTER (OUTPATIENT)
Dept: PHYSICAL THERAPY | Age: 69
Setting detail: RECURRING SERIES
End: 2024-03-25
Attending: FAMILY MEDICINE
Payer: MEDICARE

## 2024-03-25 ENCOUNTER — OFFICE VISIT (OUTPATIENT)
Dept: ORTHOPEDIC SURGERY | Age: 69
End: 2024-03-25
Payer: MEDICARE

## 2024-03-25 VITALS — BODY MASS INDEX: 29.25 KG/M2 | HEIGHT: 66 IN | WEIGHT: 182 LBS

## 2024-03-25 DIAGNOSIS — M19.012 DEGENERATIVE JOINT DISEASE OF LEFT ACROMIOCLAVICULAR JOINT: ICD-10-CM

## 2024-03-25 DIAGNOSIS — M19.011 DEGENERATIVE JOINT DISEASE OF RIGHT ACROMIOCLAVICULAR JOINT: ICD-10-CM

## 2024-03-25 DIAGNOSIS — M47.812 CERVICAL SPONDYLOSIS: ICD-10-CM

## 2024-03-25 DIAGNOSIS — M25.512 CHRONIC LEFT SHOULDER PAIN: ICD-10-CM

## 2024-03-25 DIAGNOSIS — M25.511 RIGHT SHOULDER PAIN, UNSPECIFIED CHRONICITY: Primary | ICD-10-CM

## 2024-03-25 DIAGNOSIS — G89.29 CHRONIC LEFT SHOULDER PAIN: ICD-10-CM

## 2024-03-25 PROCEDURE — G8400 PT W/DXA NO RESULTS DOC: HCPCS | Performed by: ORTHOPAEDIC SURGERY

## 2024-03-25 PROCEDURE — 1036F TOBACCO NON-USER: CPT | Performed by: ORTHOPAEDIC SURGERY

## 2024-03-25 PROCEDURE — G8427 DOCREV CUR MEDS BY ELIG CLIN: HCPCS | Performed by: ORTHOPAEDIC SURGERY

## 2024-03-25 PROCEDURE — G8419 CALC BMI OUT NRM PARAM NOF/U: HCPCS | Performed by: ORTHOPAEDIC SURGERY

## 2024-03-25 PROCEDURE — 3017F COLORECTAL CA SCREEN DOC REV: CPT | Performed by: ORTHOPAEDIC SURGERY

## 2024-03-25 PROCEDURE — G8484 FLU IMMUNIZE NO ADMIN: HCPCS | Performed by: ORTHOPAEDIC SURGERY

## 2024-03-25 PROCEDURE — 1123F ACP DISCUSS/DSCN MKR DOCD: CPT | Performed by: ORTHOPAEDIC SURGERY

## 2024-03-25 PROCEDURE — 1090F PRES/ABSN URINE INCON ASSESS: CPT | Performed by: ORTHOPAEDIC SURGERY

## 2024-03-25 PROCEDURE — 99214 OFFICE O/P EST MOD 30 MIN: CPT | Performed by: ORTHOPAEDIC SURGERY

## 2024-03-25 NOTE — PROGRESS NOTES
Candi Becerra  : 1955  Primary: Medicare Part A And B  Secondary: [employer]ARP HE[employer] C[employer] MEDICARE SUPP [facility] @ [address]  [place]  [place] [address]  Phone: [phone]  Fax: [phone]    PT Visit Info:    Total # of Visits to Date: 7  Progress Note Counter: 7  Progress Note Due Date: 24     OT Visit Info:  No data recorded    OUTPATIENT THERAPY: 3/25/2024  Episode  Appt Desk        Candi Becerra cancelled her appointment for today due to  held up at MD's office .  Will plan to follow up next during next appointment.  Thank you,  RHONDA OCAMPO PTA    Future Appointments   Date Time Provider Department Center   3/25/2024  4:00 PM Rhonda Ocampo PTA [facility]   3/26/2024  3:30 PM [facility] MRI UNIT 1 [facility]   3/28/2024  1:00 PM Deborah Perera, PT [facility]   2024  9:45 AM Lalo Gonzales Jr., MD [facility]   2024  9:20 AM Lata Evans DO [facility]   2024  2:00 PM Nicolas Ortega MD PM [facility]

## 2024-03-25 NOTE — PROGRESS NOTES
M19.012       5. Cervical spondylosis  M47.812          Report: AP and lateral views of the cervical spine demonstrate loss of lordosis with spondylosis at multiple levels    AP AP in the scapular outlet throwers and axillary views right shoulder demonstrate a type II acromion.  Degenerative changes in the AC joint.  Glenohumeral joint space is preserved.    AP AP in the scapular outlet throwers and axillary views left shoulder demonstrate a type II acromion.  Degenerative changes in the AC joint.  Glenohumeral joint space is preserved.    Impression: As above   FERNANDO DESAI JR, MD                Impression:   1. Right shoulder pain, unspecified chronicity    2. Degenerative joint disease of right acromioclavicular joint    3. Chronic left shoulder pain    4. Degenerative joint disease of left acromioclavicular joint    5. Cervical spondylosis       .  Rule out internal derangement right shoulder  Rule out internal derangement left shoulder  Hypothyroidism  Hypercholesterolemia  Obstructive sleep apnea  Coronary artery disease  History of low back surgery    Plan:   I discussed the problem with the patient.  I discussed nonoperative versus operative intervention including injections.  Specifically today I discussed the potential need for arthroscopic right shoulder intervention if she has a significant rotator cuff tear in her right shoulder.  She has not improved despite 8 weeks of supervised physical therapy and has profound loss of function and weakness in the right shoulder.  We will defer surgery and injections for now.  I would like to obtain an MRI of the right shoulder.  I will recheck her back following the MRI of the right shoulder    4.  Undiagnosed new problem with uncertain prognosis    Follow up: No follow-ups on file.             FERNANDO DESAI JR, MD

## 2024-03-26 ENCOUNTER — HOSPITAL ENCOUNTER (OUTPATIENT)
Dept: MRI IMAGING | Age: 69
Discharge: HOME | End: 2024-03-29
Attending: ORTHOPAEDIC SURGERY
Payer: MEDICARE

## 2024-03-26 DIAGNOSIS — M25.511 RIGHT SHOULDER PAIN, UNSPECIFIED CHRONICITY: ICD-10-CM

## 2024-03-26 PROCEDURE — 73221 MRI JOINT UPR EXTREM W/O DYE: CPT

## 2024-03-27 NOTE — TELEPHONE ENCOUNTER
From: Candi Becerra  To: Dr. Lata Evans  Sent: 3/23/2024 9:31 AM EDT  Subject: Referrals    Hi, could u recommend a dermatologist (female)  please. I’m due for yearly check up.   TY, Deborah Becerra

## 2024-03-28 ENCOUNTER — HOSPITAL ENCOUNTER (OUTPATIENT)
Dept: PHYSICAL THERAPY | Age: 69
Setting detail: RECURRING SERIES
End: 2024-03-28
Attending: FAMILY MEDICINE
Payer: MEDICARE

## 2024-03-28 NOTE — PROGRESS NOTES
Candi eBcerra  : 1955  Primary: Medicare Part A And B  Secondary: [employer] HE[employer] C[employer] MEDICARE SUPP [facility] @ [address] [place]  [facility] [place] [address]  Phone: [phone]  Fax: [phone]    PT Visit Info:    Total # of Visits to Date: 7  Progress Note Counter: 7  Progress Note Due Date: 24  Canceled Appointment: 2 (3/28/2024)     OT Visit Info:  No data recorded    OUTPATIENT THERAPY: 3/28/2024  Episode  Appt Desk        Candi Becerra cancelled her appointment for today due to  waiting for results from MRI .    Thank you,  DEBORAH JOHNSON, PT    Future Appointments   Date Time Provider Department Center   3/28/2024  7:00 PM Dbeorah Johnson, PT [facility]   2024  9:45 AM Lalo Gonzales Jr., MD [facility]   2024  9:20 AM Lata Evans DO [facility]   2024  2:00 PM Nicolas Ortega MD PM [facility]

## 2024-04-02 ENCOUNTER — OFFICE VISIT (OUTPATIENT)
Dept: ORTHOPEDIC SURGERY | Age: 69
End: 2024-04-02
Payer: MEDICARE

## 2024-04-02 DIAGNOSIS — M47.812 CERVICAL SPONDYLOSIS: ICD-10-CM

## 2024-04-02 DIAGNOSIS — G89.29 CHRONIC LEFT SHOULDER PAIN: ICD-10-CM

## 2024-04-02 DIAGNOSIS — M75.51 BURSITIS OF RIGHT SHOULDER: Primary | ICD-10-CM

## 2024-04-02 DIAGNOSIS — M19.012 DEGENERATIVE JOINT DISEASE OF LEFT ACROMIOCLAVICULAR JOINT: ICD-10-CM

## 2024-04-02 DIAGNOSIS — M19.011 DEGENERATIVE JOINT DISEASE OF RIGHT ACROMIOCLAVICULAR JOINT: ICD-10-CM

## 2024-04-02 DIAGNOSIS — M25.512 CHRONIC LEFT SHOULDER PAIN: ICD-10-CM

## 2024-04-02 PROCEDURE — G8400 PT W/DXA NO RESULTS DOC: HCPCS | Performed by: ORTHOPAEDIC SURGERY

## 2024-04-02 PROCEDURE — 99214 OFFICE O/P EST MOD 30 MIN: CPT | Performed by: ORTHOPAEDIC SURGERY

## 2024-04-02 PROCEDURE — 1090F PRES/ABSN URINE INCON ASSESS: CPT | Performed by: ORTHOPAEDIC SURGERY

## 2024-04-02 PROCEDURE — 1123F ACP DISCUSS/DSCN MKR DOCD: CPT | Performed by: ORTHOPAEDIC SURGERY

## 2024-04-02 PROCEDURE — 1036F TOBACCO NON-USER: CPT | Performed by: ORTHOPAEDIC SURGERY

## 2024-04-02 PROCEDURE — 3017F COLORECTAL CA SCREEN DOC REV: CPT | Performed by: ORTHOPAEDIC SURGERY

## 2024-04-02 PROCEDURE — G8427 DOCREV CUR MEDS BY ELIG CLIN: HCPCS | Performed by: ORTHOPAEDIC SURGERY

## 2024-04-02 PROCEDURE — G8419 CALC BMI OUT NRM PARAM NOF/U: HCPCS | Performed by: ORTHOPAEDIC SURGERY

## 2024-04-02 RX ORDER — METHYLPREDNISOLONE 4 MG/1
TABLET ORAL
Qty: 1 KIT | Refills: 0 | Status: SHIPPED | OUTPATIENT
Start: 2024-04-02

## 2024-04-02 NOTE — PROGRESS NOTES
spondylosis       .  Rule out internal derangement right shoulder  Rule out internal derangement left shoulder  Hypothyroidism  Hypercholesterolemia  Obstructive sleep apnea  Coronary artery disease  History of low back surgery    Plan:   I discussed the problem with the patient.  I discussed nonoperative versus operative intervention including injections.  I discussed the indications for arthroscopic right shoulder intervention.  She is not an arthroscopic right shoulder candidate at this point.  We will defer injections.  I will provide her a Medrol Dosepak.  We will put her back in supervised physical therapy neck shoulder dry needling all modalities.  I will have her evaluated by John Bowman.  I believe she has a component of cervical spine pathology that is contributing to her current clinical situation.  I will recheck her back in 6 weeks    4.  Chronic illness with exacerbation    Follow up: Return in about 6 weeks (around 5/14/2024).             FERNANDO DESAI JR, MD

## 2024-04-09 ENCOUNTER — PATIENT MESSAGE (OUTPATIENT)
Dept: PRIMARY CARE CLINIC | Facility: CLINIC | Age: 69
End: 2024-04-09

## 2024-04-09 ENCOUNTER — OFFICE VISIT (OUTPATIENT)
Dept: ORTHOPEDIC SURGERY | Age: 69
End: 2024-04-09
Payer: MEDICARE

## 2024-04-09 VITALS — HEIGHT: 66 IN | WEIGHT: 182 LBS | BODY MASS INDEX: 29.25 KG/M2

## 2024-04-09 DIAGNOSIS — M47.812 CERVICAL SPONDYLOSIS: Primary | ICD-10-CM

## 2024-04-09 DIAGNOSIS — M50.30 DDD (DEGENERATIVE DISC DISEASE), CERVICAL: ICD-10-CM

## 2024-04-09 PROCEDURE — 1123F ACP DISCUSS/DSCN MKR DOCD: CPT | Performed by: PHYSICIAN ASSISTANT

## 2024-04-09 PROCEDURE — 1090F PRES/ABSN URINE INCON ASSESS: CPT | Performed by: PHYSICIAN ASSISTANT

## 2024-04-09 PROCEDURE — 1036F TOBACCO NON-USER: CPT | Performed by: PHYSICIAN ASSISTANT

## 2024-04-09 PROCEDURE — G8427 DOCREV CUR MEDS BY ELIG CLIN: HCPCS | Performed by: PHYSICIAN ASSISTANT

## 2024-04-09 PROCEDURE — G8400 PT W/DXA NO RESULTS DOC: HCPCS | Performed by: PHYSICIAN ASSISTANT

## 2024-04-09 PROCEDURE — 3017F COLORECTAL CA SCREEN DOC REV: CPT | Performed by: PHYSICIAN ASSISTANT

## 2024-04-09 PROCEDURE — G2211 COMPLEX E/M VISIT ADD ON: HCPCS | Performed by: PHYSICIAN ASSISTANT

## 2024-04-09 PROCEDURE — 99204 OFFICE O/P NEW MOD 45 MIN: CPT | Performed by: PHYSICIAN ASSISTANT

## 2024-04-09 PROCEDURE — G8419 CALC BMI OUT NRM PARAM NOF/U: HCPCS | Performed by: PHYSICIAN ASSISTANT

## 2024-04-09 RX ORDER — CITALOPRAM 40 MG/1
40 TABLET ORAL DAILY
Qty: 90 TABLET | Refills: 2 | Status: SHIPPED | OUTPATIENT
Start: 2024-04-09

## 2024-04-09 NOTE — PROGRESS NOTES
Name: Candi Becerra  YOB: 1955  Gender: female  MRN: [id number]    CC:   Chief Complaint   Patient presents with    New Patient     Neck pain          HPI:   Candi Becerra is a 68 y.o. female with a PMHx of comorbidities below.         They present here for evaluation of rating pain to the bilateral shoulders and upper arm.  Her symptoms are bilateral with that they are worse on the right than the left.  She is having pain with certain motions such as reaching forward and up.  She has been seeing Dr. Gonzales who is evaluated her right shoulder and discussed treatment options for the pathology noted there.  She has been through multiple rounds of physical therapy without meaningful improvement and is intending to start another 1 in the coming weeks.  She is recently finished an oral course of steroids which has provided some improvement in her symptoms.  She is also tried anti-inflammatories.  She has a history of previous lumbar fusion of note.  She has no numbness or tingling in her arms per se.  Her symptoms have been going on for about 5 months.  She moved to [place] from [place] about 3 months ago.  She feels the move may have exacerbated and contributed to some of her pain and soreness.          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   Social History:   Social History     Tobacco Use

## 2024-04-09 NOTE — TELEPHONE ENCOUNTER
Lorraine Savage MA 4/9/2024 11:54 AM EDT    Pt would like Citolopram sent to [facility] for scripts.  [facility] is the mail order company for 90 day supply.   Mail order pharmacy [phone]      ----- Message -----  From: Liz John  Sent: 4/9/2024 10:38 AM EDT  To: Lata vEans DO; *  Subject: FW: Refill please citalopram 40mg       ----- Message -----  From: Candi Becerra  Sent: 4/9/2024 10:32 AM EDT  To: *  Subject: Refill please citalopram 40mg     My insurance is [employer] for scripts.  [facility] is the mail order company for 90 day supply.   Mail order pharmacy [phone]  TY

## 2024-04-16 ENCOUNTER — OFFICE VISIT (OUTPATIENT)
Dept: ORTHOPEDIC SURGERY | Age: 69
End: 2024-04-16
Payer: MEDICARE

## 2024-04-16 VITALS — BODY MASS INDEX: 29.25 KG/M2 | HEIGHT: 66 IN | WEIGHT: 182 LBS

## 2024-04-16 DIAGNOSIS — I25.10 CORONARY ARTERY DISEASE INVOLVING NATIVE CORONARY ARTERY OF NATIVE HEART WITHOUT ANGINA PECTORIS: ICD-10-CM

## 2024-04-16 DIAGNOSIS — M54.12 CERVICAL RADICULOPATHY: Primary | ICD-10-CM

## 2024-04-16 LAB
CHOLEST SERPL-MCNC: 298 MG/DL
HDLC SERPL-MCNC: 74 MG/DL (ref 40–60)
HDLC SERPL: 4
LDLC SERPL CALC-MCNC: 200.8 MG/DL
TRIGL SERPL-MCNC: 116 MG/DL (ref 35–150)
VLDLC SERPL CALC-MCNC: 23.2 MG/DL (ref 6–23)

## 2024-04-16 PROCEDURE — 3017F COLORECTAL CA SCREEN DOC REV: CPT | Performed by: PHYSICIAN ASSISTANT

## 2024-04-16 PROCEDURE — G8400 PT W/DXA NO RESULTS DOC: HCPCS | Performed by: PHYSICIAN ASSISTANT

## 2024-04-16 PROCEDURE — G8419 CALC BMI OUT NRM PARAM NOF/U: HCPCS | Performed by: PHYSICIAN ASSISTANT

## 2024-04-16 PROCEDURE — 1123F ACP DISCUSS/DSCN MKR DOCD: CPT | Performed by: PHYSICIAN ASSISTANT

## 2024-04-16 PROCEDURE — 1090F PRES/ABSN URINE INCON ASSESS: CPT | Performed by: PHYSICIAN ASSISTANT

## 2024-04-16 PROCEDURE — 1036F TOBACCO NON-USER: CPT | Performed by: PHYSICIAN ASSISTANT

## 2024-04-16 PROCEDURE — 99214 OFFICE O/P EST MOD 30 MIN: CPT | Performed by: PHYSICIAN ASSISTANT

## 2024-04-16 PROCEDURE — G8427 DOCREV CUR MEDS BY ELIG CLIN: HCPCS | Performed by: PHYSICIAN ASSISTANT

## 2024-04-16 NOTE — PROGRESS NOTES
04/16/24        Name: Candi Becerra  YOB: 1955  Gender: female  MRN: [id number]        MRI/IMAGING/STUDY FOLLOWUP    CC: Follow-up (MRI Results )       HPI: Candi Becerra is a 68 y.o. female who returns for Follow-up (MRI Results )           Patient presents to the office today for follow-up to review MRI results.          Meds/PSH/PMH/FH/SH: This information has been reviewed.      ALLERGIES:   Allergies   Allergen Reactions    Iodinated Contrast Media     Statins Rash              Physical Examination:            Imaging:         MRI Result (most recent):  MRI CERVICAL SPINE WO CONTRAST 04/09/2024    Narrative  History: Spondylosis without myelopathy or radiculopathy, cervical region; Other  cervical disc degeneration, unspecified cervical region    Exam: MRI Cervical Spine without contrast    Technique: Sagittal T1, T2, STIR, axial T2 and gradient echo sequences are  available for review.    COMPARISON: March 25, 2024 x-ray    FINDINGS: There is loss of the lordosis. There is grade 1 retrolisthesis at  C4-5, 0.3 cm. Vertebral body height is maintained.   Vertebral body marrow  signal is normal.  The intervertebral disc shows desiccation. The facet  articulations are aligned.    C2-C3:   There is no significant disc protrusion. There is no lateral recess or  foraminal stenosis.  There is no central canal stenosis.    C3-C4: There is no significant disc protrusion. There is no lateral recess or  foraminal stenosis.  There is no central canal stenosis.    C4-C5: There is mild central and left paracentral disc protrusion. There is mild  left lateral recess effacement. There is mild right and moderate left foraminal  narrowing secondary to disc and osteophyte protrusion. There is mild central  canal stenosis.    C5-C6: There is mild central and right and left paracentral disc protrusion.  There is mild bilateral lateral recess stenosis. There is mild bilateral  foraminal narrowing secondary to disc

## 2024-04-17 ENCOUNTER — TELEPHONE (OUTPATIENT)
Age: 69
End: 2024-04-17

## 2024-04-17 ENCOUNTER — HOSPITAL ENCOUNTER (OUTPATIENT)
Dept: PHYSICAL THERAPY | Age: 69
Setting detail: RECURRING SERIES
End: 2024-04-17
Attending: ORTHOPAEDIC SURGERY
Payer: MEDICARE

## 2024-04-17 NOTE — THERAPY EVALUATION
Candi Becerra  : 1955  Primary: Medicare Part A And B (Medicare)  Secondary: AARP HEALTH CARE MEDICARE SUPP [facility] @ [facility] [address]  [facility] [address]  Phone: [phone]  Fax: [phone]           Plan of Care/Certification Expiration Date:     Frequency/Duration:      Time In/Out:          PT Visit Info:         Visit Count:  Visit count could not be calculated. Make sure you are using a visit which is associated with an episode.                OUTPATIENT PHYSICAL THERAPY:             {OP Note Type:22200::\"Initial Assessment\"} 2024               Episode (No data found)         Treatment Diagnosis:  ***   No data found  Medical/Referring Diagnosis:    Bursitis of right shoulder    Referring Physician:  Lalo Gonzales Jr., MD MD Orders:  PT Eval and Treat ***  Return MD Appt:  ***  Date of Onset:    ***  Allergies:  Iodinated contrast media and Statins  Restrictions/Precautions:    {Precautions/Restrictions:49662}      Medications Last Reviewed:  2024     SUBJECTIVE   History of Injury/Illness (Reason for Referral):  ***  Patient Stated Goal(s):  \"***\"  Initial Pain Level:       /10   Post Session Pain Level:      /10  Past Medical History/Comorbidities:   Ms. Becerra  has a past medical history of Bursitis of both hips, CAD (coronary artery disease), Chronic back pain, Hyperlipidemia, Hypothyroidism, Muscle spasm, Neuropathy, Sciatic nerve disease, unspecified laterality, Sleep apnea, and Tendinitis.  Ms. Becerra  has a past surgical history that includes Tonsillectomy (); Hysterectomy, total abdominal (); Rotator cuff repair (); back surgery (2003); Foot osteotomy w/ plantar fascia release (Right, ); and Tibia fracture surgery (Right, ).  Social History/Living Environment:   {PT/OT Social History/Living Environment:03420}    Prior Level of Function/Work/Activity:   {PT/OT Level of Function/Work/Activity:19289}     Learning:

## 2024-04-17 NOTE — TELEPHONE ENCOUNTER
Spoke with pt and gave Dr. Terry's result. Pt scheduled for follow up to discuss alternate cholesterol medications.

## 2024-04-17 NOTE — TELEPHONE ENCOUNTER
----- Message from Jm Terry III, MD sent at 4/17/2024 10:19 AM EDT -----  Please let patient know that cholesterol still very high.  Will need to have her come in the office to discuss alternative treatment options for cholesterol.

## 2024-04-18 ENCOUNTER — HOSPITAL ENCOUNTER (OUTPATIENT)
Dept: PHYSICAL THERAPY | Age: 69
Setting detail: RECURRING SERIES
Discharge: HOME | End: 2024-04-21
Attending: ORTHOPAEDIC SURGERY
Payer: MEDICARE

## 2024-04-18 DIAGNOSIS — M25.511 ACUTE PAIN OF RIGHT SHOULDER: ICD-10-CM

## 2024-04-18 DIAGNOSIS — M25.512 ACUTE PAIN OF LEFT SHOULDER: ICD-10-CM

## 2024-04-18 DIAGNOSIS — M54.12 CERVICAL RADICULOPATHY: Primary | ICD-10-CM

## 2024-04-18 PROCEDURE — 97140 MANUAL THERAPY 1/> REGIONS: CPT

## 2024-04-18 PROCEDURE — 97162 PT EVAL MOD COMPLEX 30 MIN: CPT

## 2024-04-18 NOTE — THERAPY EVALUATION
Candi Becerra  : 1955  Primary: Medicare Part A And B (Medicare)  Secondary: AARP HEALTH CARE MEDICARE SUPP [facility] [address] [place] [address]  Phone: [phone]  Fax: [phone] Plan Frequency: 1-2x/week for 60 days    Plan of Care/Certification Expiration Date: 24        Plan of Care/Certification Expiration Date:  Plan of Care/Certification Expiration Date: 24    Frequency/Duration:   Plan Frequency: 1-2x/week for 60 days      Time In/Out:   Time In: 1130  Time Out: 1215      PT Visit Info:    Total # of Visits to Date: 1      Visit Count:  1                OUTPATIENT PHYSICAL THERAPY:             Initial Assessment 2024               Episode (B shoulder and neck pain)         Treatment Diagnosis:     Cervical radiculopathy  Acute pain of right shoulder  Acute pain of left shoulder  Medical/Referring Diagnosis:    Bilaterals shoulder, neck  Referring Physician:  Lalo Gonzales Jr., MD MD Orders:  eval & treat, home exercise program, strengthening, range of motion, traction, dry needling, deep massage, and all modalities   Return MD Appt:  5-15-24  Date of Onset:    6+ months  Allergies:  Iodinated contrast media and Statins  Restrictions/Precautions:    None      Medications Last Reviewed:  2024     SUBJECTIVE   History of Injury/Illness (Reason for Referral):  Pt reports bilateral shoulder pain that started 6+ months ago and no injury. R shoulder was hurting more than L shoulder. She did try physical therapy for shoulder pain and it did not help. She returned to [place] and had an MRI on the shoulder that did not show anything and was referred to spine MD. She had an MRI on her cervical spine that showed some issues with her spine that could be causing her problems. She was referred to pain specialist for possible injection in her neck and will see them in May. She was given a steroid dose pack a couple weeks ago and that

## 2024-04-18 NOTE — PROGRESS NOTES
Candi Becerra  : 1955  Primary: Medicare Part A And B (Medicare)  Secondary: AARP HEALTH CARE MEDICARE SUPP [facility] [address] [place] [address]  Phone: [phone]  Fax: [phone] Plan Frequency: 1-2x/week for 60 days    Plan of Care/Certification Expiration Date: 24        Plan of Care/Certification Expiration Date:  Plan of Care/Certification Expiration Date: 24    Frequency/Duration:   Plan Frequency: 1-2x/week for 60 days      Time In/Out:   Time In: 1130  Time Out: 1215      PT Visit Info:    Total # of Visits to Date: 1      Visit Count:  1    OUTPATIENT PHYSICAL THERAPY:   Treatment Note 2024       Episode  (B shoulder and neck pain)               Treatment Diagnosis:    Cervical radiculopathy  Acute pain of right shoulder  Acute pain of left shoulder  Medical/Referring Diagnosis:    Bilateral shoulder and neck  Referring Physician:  Lalo Gonzales Jr., MD MD Orders:  eval & treat, home exercise program, strengthening, range of motion, traction, dry needling, deep massage, and all modalities   Return MD Appt:  5-15-24   Date of Onset:  Onset Date:  (Chronic)     Allergies:   Iodinated contrast media and Statins  Restrictions/Precautions:   None      Interventions Planned (Treatment may consist of any combination of the following):  Current Treatment Recommendations: Strengthening; ROM; Dry needling; Home exercise program; Manual; Modalities    See Assessment Note    Subjective Comments:   See Eval  Initial Pain Level::     5 /10  Post Session Pain Level:      5  /10  Medications Last Reviewed:  2024  Updated Objective Findings:  See Evaluation Note from today  Treatment   MANUAL THERAPY: (15 minutes):   Joint mobilization and Soft tissue mobilization was utilized and necessary because of the patient's restricted joint motion and restricted motion of soft tissue. Pt prone and central PA's to thoracic spine and cervical

## 2024-04-22 ENCOUNTER — HOSPITAL ENCOUNTER (OUTPATIENT)
Dept: PHYSICAL THERAPY | Age: 69
Setting detail: RECURRING SERIES
Discharge: HOME | End: 2024-04-25
Attending: ORTHOPAEDIC SURGERY
Payer: MEDICARE

## 2024-04-22 PROCEDURE — 97140 MANUAL THERAPY 1/> REGIONS: CPT

## 2024-04-22 PROCEDURE — 97110 THERAPEUTIC EXERCISES: CPT

## 2024-04-22 ASSESSMENT — PAIN SCALES - GENERAL: PAINLEVEL_OUTOF10: 5

## 2024-04-22 NOTE — PROGRESS NOTES
Candi Becerra  : 1955  Primary: Medicare Part A And B (Medicare)  Secondary: AARP HEALTH CARE MEDICARE SUPP [facility] [address] [place] [address]  Phone: [phone]  Fax: [phone] Plan Frequency: 1-2x/week for 60 days    Plan of Care/Certification Expiration Date: 24        Plan of Care/Certification Expiration Date:  Plan of Care/Certification Expiration Date: 24    Frequency/Duration:   Plan Frequency: 1-2x/week for 60 days      Time In/Out:   Time In: 1120  Time Out: 1205      PT Visit Info:    Total # of Visits to Date: 2  Progress Note Counter: 2      Visit Count:  2    OUTPATIENT PHYSICAL THERAPY:   Treatment Note 2024       Episode  (B shoulder and neck pain)               Treatment Diagnosis:    Cervical radiculopathy  Acute pain of right shoulder  Acute pain of left shoulder  Medical/Referring Diagnosis:    Bilateral shoulder and neck  Referring Physician:  Lalo Gonzales Jr., MD MD Orders:  eval & treat, home exercise program, strengthening, range of motion, traction, dry needling, deep massage, and all modalities   Return MD Appt:  5-15-24   Date of Onset:  Onset Date:  (Chronic)     Allergies:   Iodinated contrast media and Statins  Restrictions/Precautions:   None      Interventions Planned (Treatment may consist of any combination of the following):  Current Treatment Recommendations: Strengthening; ROM; Dry needling; Home exercise program; Manual; Modalities    See Assessment Note    Subjective Comments: Patient reports she feels like \"maybe the therapy helped a little\" last session, but was temporary.  She notes her L shoulder is constant pain, even at rest, but her R shoulder hurts much worse with activity, especially with activities overhead or reaching.    Initial Pain Level::     5/10  Post Session Pain Level:       6/10  Medications Last Reviewed:  2024  Updated Objective Findings:  None Today  Treatment

## 2024-04-26 ENCOUNTER — HOSPITAL ENCOUNTER (OUTPATIENT)
Dept: PHYSICAL THERAPY | Age: 69
Setting detail: RECURRING SERIES
Discharge: HOME | End: 2024-04-29
Attending: ORTHOPAEDIC SURGERY
Payer: MEDICARE

## 2024-04-26 ENCOUNTER — OFFICE VISIT (OUTPATIENT)
Age: 69
End: 2024-04-26
Payer: MEDICARE

## 2024-04-26 VITALS
SYSTOLIC BLOOD PRESSURE: 106 MMHG | BODY MASS INDEX: 28.72 KG/M2 | DIASTOLIC BLOOD PRESSURE: 76 MMHG | HEART RATE: 74 BPM | WEIGHT: 183 LBS | HEIGHT: 67 IN

## 2024-04-26 DIAGNOSIS — E78.49 FAMILIAL HYPERLIPIDEMIA: Primary | ICD-10-CM

## 2024-04-26 DIAGNOSIS — I25.10 CORONARY ARTERY DISEASE INVOLVING NATIVE CORONARY ARTERY OF NATIVE HEART WITHOUT ANGINA PECTORIS: ICD-10-CM

## 2024-04-26 PROCEDURE — 97140 MANUAL THERAPY 1/> REGIONS: CPT

## 2024-04-26 PROCEDURE — 1036F TOBACCO NON-USER: CPT | Performed by: INTERNAL MEDICINE

## 2024-04-26 PROCEDURE — 1123F ACP DISCUSS/DSCN MKR DOCD: CPT | Performed by: INTERNAL MEDICINE

## 2024-04-26 PROCEDURE — G8428 CUR MEDS NOT DOCUMENT: HCPCS | Performed by: INTERNAL MEDICINE

## 2024-04-26 PROCEDURE — G8419 CALC BMI OUT NRM PARAM NOF/U: HCPCS | Performed by: INTERNAL MEDICINE

## 2024-04-26 PROCEDURE — 97035 APP MDLTY 1+ULTRASOUND EA 15: CPT

## 2024-04-26 PROCEDURE — 99214 OFFICE O/P EST MOD 30 MIN: CPT | Performed by: INTERNAL MEDICINE

## 2024-04-26 PROCEDURE — 1090F PRES/ABSN URINE INCON ASSESS: CPT | Performed by: INTERNAL MEDICINE

## 2024-04-26 PROCEDURE — G8400 PT W/DXA NO RESULTS DOC: HCPCS | Performed by: INTERNAL MEDICINE

## 2024-04-26 PROCEDURE — 3017F COLORECTAL CA SCREEN DOC REV: CPT | Performed by: INTERNAL MEDICINE

## 2024-04-26 ASSESSMENT — ENCOUNTER SYMPTOMS
SHORTNESS OF BREATH: 0
ABDOMINAL PAIN: 0

## 2024-04-26 NOTE — PROGRESS NOTES
4/30/2024  1:00 PM Jazmín Gaitan, PTA [facility]   5/7/2024 11:15 AM Magalys Blanchard, PT [facility]   5/9/2024  1:00 PM Magalys Blanchard, PT [facility]   5/14/2024  1:00 PM Magalys Blanchard, PT [facility]   5/16/2024  1:00 PM Magalys Blanchard, PT [facility]   5/21/2024  9:20 AM Lata Evans, DO [facility]   5/21/2024  1:00 PM Magalys Blanchard, PT [facility]   5/23/2024  1:00 PM Jazmín Gaitan, PTA [facility]   5/28/2024  1:45 PM Magalys Blanchard, PT [facility]   5/29/2024  2:00 PM Nicolas Ortega MD PM [facility]   5/29/2024  2:45 PM Lalo Gonzales Jr., MD [facility]   5/30/2024  1:45 PM Magalys Blanchard, PT [facility]   7/19/2024  1:00 PM Jm Terry III, MD [facility]

## 2024-04-26 NOTE — PROGRESS NOTES
[facility] CARDIOLOGY  [address] [place] [address], [address]  [place] [address]  PHONE: [phone]      24    NAME:  Candi Becerra  : 1955  MRN: [id number]         SUBJECTIVE:   Candi Becerra is a 68 y.o. female seen for a follow up visit regarding the following:     Chief Complaint   Patient presents with    Follow-up    Coronary Artery Disease            HPI:  Follow up  Follow-up and Coronary Artery Disease   .    Ms. Becerra presents today for follow-up.  We reviewed her repeat lipid panel which shows total cholesterol 300, HDL of 74 and LDL of 200.  She tolerated the low-dose pravastatin fairly well however unfortunately this has not resulted in significant improvement in her lipids.  She has previously tried and failed several different statins including rosuvastatin, atorvastatin.  She is also been on Repatha and Praluent which she did not tolerate due to side effects.  She is taking Zetia previously and did not tolerate due to side effects.  She is concerned about possibility of vascular disease, strokes and heart attacks if you are unable to get her lipids better controlled    Coronary Artery Disease  Pertinent negatives include no shortness of breath.           Cardiac Medications       Anaphylaxis Therapy Agents       EPINEPHrine (EPIPEN) 0.3 MG/0.3ML SOAJ injection        HMG CoA Reductase Inhibitors       pravastatin (PRAVACHOL) 10 MG tablet Take 2 tablets by mouth daily                  Past Medical History, Past Surgical History, Family history, Social History, and Medications were all reviewed with the patient today and updated as necessary.     Prior to Admission medications    Medication Sig Start Date End Date Taking? Authorizing Provider   citalopram (CELEXA) 40 MG tablet Take 1 tablet by mouth daily 24  Yes Lata Evans DO   ALPRAZolam (XANAX) 0.25 MG tablet TAKE 1 TABLET EVERY DAY BY ORAL ROUTE AS NEEDED. 23  Yes Provider, MD Diann   EPINEPHrine (EPIPEN)

## 2024-04-30 ENCOUNTER — HOSPITAL ENCOUNTER (OUTPATIENT)
Dept: PHYSICAL THERAPY | Age: 69
Setting detail: RECURRING SERIES
Discharge: HOME | End: 2024-05-03
Attending: ORTHOPAEDIC SURGERY
Payer: MEDICARE

## 2024-04-30 PROCEDURE — 97110 THERAPEUTIC EXERCISES: CPT

## 2024-04-30 PROCEDURE — 97140 MANUAL THERAPY 1/> REGIONS: CPT

## 2024-04-30 ASSESSMENT — PAIN SCALES - GENERAL: PAINLEVEL_OUTOF10: 5

## 2024-04-30 NOTE — PROGRESS NOTES
Today  Treatment   THERAPEUTIC EXERCISE: (20 minutes):    Exercises per grid below to improve mobility and coordination.  Required moderate verbal and tactile cues to promote proper body alignment, promote proper body posture, promote proper body mechanics, and promote proper body breathing techniques.  Progressed resistance, range, repetitions, and complexity of movement as indicated.   Date:  4/22/24 Date:  4/30/24 Date:     Activity/Exercise Parameters Parameters Parameters   Scapular retraction X 20 seated with out mirror  X 10 standing in front of mirror X 20 seated with out mirror    Chin tuck X 20 seated with out mirror  X 10 standing in front of mirror X 20 seated with out mirror    rows Green theraband x 10     Diaphragmatic breathing  Relaxation exercises 2 minutes    Tennis ball self mob  L periscapular                    MANUAL THERAPY: (30 minutes):   Joint mobilization and Soft tissue mobilization was utilized and necessary because of the patient's restricted joint motion and restricted motion of soft tissue. Pt prone and central PA's to STM/TrP release to B upper trap, levator scapula region in seated  STM to B cervical paraspinals, suboccipital release and gentle scapular depression stretches in hooklying       Treatment/Session Summary:    Treatment Assessment: Patient reported continued pain in L cervical/trap after todays session (about the same as before) but reported significant decrease in overall tension and feeling like B UE were \"less heavy\".  Communication/Consultation:  Spoke with patient in regards to posture, sleeping positions for neck support.  Equipment provided today:  HEP Access Code [email or web address]   Recommendations/Intent for next treatment session: Next visit will focus on cervical and thoracic spine mobility, nerve mobilizations, dry needling?.    >Total Treatment Billable Duration:  50 minutes  Time In: 1300  Time Out: 1350    Jazmín Gaitan PTA         Charge Capture  [employer] Portal

## 2024-05-01 ENCOUNTER — APPOINTMENT (RX ONLY)
Dept: URBAN - METROPOLITAN AREA CLINIC 25 | Facility: CLINIC | Age: 69
Setting detail: DERMATOLOGY
End: 2024-05-01

## 2024-05-01 DIAGNOSIS — L82.1 OTHER SEBORRHEIC KERATOSIS: ICD-10-CM

## 2024-05-01 DIAGNOSIS — L91.8 OTHER HYPERTROPHIC DISORDERS OF THE SKIN: ICD-10-CM

## 2024-05-01 DIAGNOSIS — L57.8 OTHER SKIN CHANGES DUE TO CHRONIC EXPOSURE TO NONIONIZING RADIATION: ICD-10-CM

## 2024-05-01 DIAGNOSIS — L57.0 ACTINIC KERATOSIS: ICD-10-CM

## 2024-05-01 DIAGNOSIS — D22 MELANOCYTIC NEVI: ICD-10-CM

## 2024-05-01 PROBLEM — D22.5 MELANOCYTIC NEVI OF TRUNK: Status: ACTIVE | Noted: 2024-05-01

## 2024-05-01 PROCEDURE — 99203 OFFICE O/P NEW LOW 30 MIN: CPT | Mod: 25

## 2024-05-01 PROCEDURE — 17003 DESTRUCT PREMALG LES 2-14: CPT

## 2024-05-01 PROCEDURE — ? COUNSELING

## 2024-05-01 PROCEDURE — 17000 DESTRUCT PREMALG LESION: CPT

## 2024-05-01 PROCEDURE — ? SUNSCREEN RECOMMENDATIONS

## 2024-05-01 PROCEDURE — ? LIQUID NITROGEN

## 2024-05-01 ASSESSMENT — LOCATION SIMPLE DESCRIPTION DERM
LOCATION SIMPLE: RIGHT POSTERIOR AXILLA
LOCATION SIMPLE: RIGHT FOREHEAD
LOCATION SIMPLE: RIGHT PRETIBIAL REGION
LOCATION SIMPLE: LEFT AXILLARY VAULT
LOCATION SIMPLE: RIGHT UPPER BACK
LOCATION SIMPLE: LEFT PRETIBIAL REGION
LOCATION SIMPLE: LEFT CHEEK

## 2024-05-01 ASSESSMENT — LOCATION DETAILED DESCRIPTION DERM
LOCATION DETAILED: RIGHT MEDIAL UPPER BACK
LOCATION DETAILED: LEFT AXILLARY VAULT
LOCATION DETAILED: RIGHT POSTERIOR AXILLA
LOCATION DETAILED: RIGHT SUPERIOR UPPER BACK
LOCATION DETAILED: LEFT SUPERIOR CENTRAL MALAR CHEEK
LOCATION DETAILED: RIGHT FOREHEAD
LOCATION DETAILED: RIGHT PROXIMAL PRETIBIAL REGION
LOCATION DETAILED: LEFT INFERIOR CENTRAL MALAR CHEEK
LOCATION DETAILED: LEFT DISTAL PRETIBIAL REGION
LOCATION DETAILED: LEFT PROXIMAL PRETIBIAL REGION

## 2024-05-01 ASSESSMENT — LOCATION ZONE DERM
LOCATION ZONE: FACE
LOCATION ZONE: AXILLAE
LOCATION ZONE: LEG
LOCATION ZONE: TRUNK

## 2024-05-01 NOTE — PROCEDURE: SUNSCREEN RECOMMENDATIONS
Detail Level: Detailed
Products Recommended: All sunscreens sold over the counter are FDA-approved and will work well if applied as described above. The following are products that I like for various areas of the body:\\nFace:\\n- Curated Protect SPF40 sunscreen with CE Ferulic\\n- SkinCeuticals Physical Fusion UV Defense SPF 50\\n- EltaMD UV Clear Broad-Spectrum SPF 46\\n- Revision Intellishade SPF 45\\n\\nBody: \\n- EltaMD UV Spray Broad-Spectrum SPF 46\\n- EltaMD UV Pure Broad-Spectrum SPF 47\\n- EltaMD UV Sport Broad-Spectrum SPF 50\\n- Neutrogena Ultra Sheer Face & Body Sunscreen Stick SPF 70\\n- Neutrogena Sheer Zinc Broad-Spectrum SPF 50\\n\\nLips:\\n- Aquaphor Lip Protectant + Sunscreen SPF 30\\n- EltaMD UV Lip [place] Broad-Spectrum SPF 36\\n\\nScalp:\\n- Supergoop Poof Part Powder SPF 50
General Sunscreen Counseling: I recommended a broad spectrum sunscreen with a SPF of 30 or higher.  I explained that SPF 30 sunscreens block approximately 97 percent of the sun's harmful rays.  Sunscreens should be applied at least 15 minutes prior to expected sun exposure and then every 2 hours after that as long as sun exposure continues. If swimming or exercising sunscreen should be reapplied every 45 minutes to an hour after getting wet or sweating.  One ounce, or the equivalent of a shot glass full of sunscreen, is adequate to protect the skin not covered by a bathing suit. I also recommended a lip balm with a sunscreen as well. Sun protective clothing can be used in lieu of sunscreen but must be worn the entire time you are exposed to the sun's rays.

## 2024-05-02 ENCOUNTER — HOSPITAL ENCOUNTER (OUTPATIENT)
Dept: PHYSICAL THERAPY | Age: 69
Setting detail: RECURRING SERIES
Discharge: HOME | End: 2024-05-05
Attending: ORTHOPAEDIC SURGERY
Payer: MEDICARE

## 2024-05-02 PROCEDURE — 97110 THERAPEUTIC EXERCISES: CPT

## 2024-05-02 PROCEDURE — 97140 MANUAL THERAPY 1/> REGIONS: CPT

## 2024-05-02 NOTE — PROGRESS NOTES
Treatment/Session Summary:    Treatment Assessment: Soreness at end of session after dry needling. Attempted prone shoulder ER and middle trap but held due to shoulder pain.  Communication/Consultation:  none  Equipment provided today:  HEP Access Code [email or web address]   Recommendations/Intent for next treatment session: Next visit will focus on cervical and thoracic spine mobility, nerve mobilizations, dry needling?.    >Total Treatment Billable Duration:  40 minutes  Time In: 1300  Time Out: 1350    Magalys Blanchard PT         Charge Capture  [employer] Portal  Appt Desk     Future Appointments   Date Time Provider Department Center   5/8/2024  2:30 PM Magalys Blanchard, PT [facility]   5/10/2024 12:15 PM Magalys Blanchard, PT [facility]   5/14/2024  1:00 PM Magalys Blanchard, PT [facility]   5/16/2024  1:00 PM Magalys Blanchard, PT [facility]   5/21/2024  9:20 AM Lata Evans DO [facility]   5/21/2024  1:00 PM Magalys Blanchard, PT [facility]   5/23/2024  1:00 PM Jazmín Gaitan, PTA [facility]   5/28/2024  1:45 PM Magalys Blanchard, PT [facility]   5/29/2024  2:00 PM Nicolas Ortega MD PM [facility]   5/30/2024  1:45 PM Magalys Blanchard, PT [facility]   6/5/2024  2:45 PM Lalo Gonzales Jr., MD [facility]   7/19/2024  1:00 PM Jm Terry III, MD [facility]

## 2024-05-03 ENCOUNTER — TELEPHONE (OUTPATIENT)
Age: 69
End: 2024-05-03

## 2024-05-03 NOTE — TELEPHONE ENCOUNTER
[facility] with [facility] called stating she needs the following :    The referral sent to them recently was missing the frequency and dosage of Leqvio  Please fax to complete the referral    Fax [phone]      Tele [phone] ext [phone]

## 2024-05-06 NOTE — TELEPHONE ENCOUNTER
I faxed the updated frequency and dosage on the leqvio form to [facility]. [phone]

## 2024-05-07 ENCOUNTER — APPOINTMENT (OUTPATIENT)
Dept: PHYSICAL THERAPY | Age: 69
End: 2024-05-07
Attending: ORTHOPAEDIC SURGERY
Payer: MEDICARE

## 2024-05-08 ENCOUNTER — HOSPITAL ENCOUNTER (OUTPATIENT)
Dept: PHYSICAL THERAPY | Age: 69
Setting detail: RECURRING SERIES
Discharge: HOME | End: 2024-05-11
Attending: ORTHOPAEDIC SURGERY
Payer: MEDICARE

## 2024-05-08 PROCEDURE — 97110 THERAPEUTIC EXERCISES: CPT

## 2024-05-08 PROCEDURE — 97012 MECHANICAL TRACTION THERAPY: CPT

## 2024-05-08 NOTE — PROGRESS NOTES
Candi Becerra  : 1955  Primary: Medicare Part A And B (Medicare)  Secondary: [employer] HE[employer] C[employer] MEDICARE SUPP [facility] [address]  [place] [address]  Phone: [phone]  Fax: [phone] Plan Frequency: 1-2x/week for 60 days    Plan of Care/Certification Expiration Date: 24        Plan of Care/Certification Expiration Date:  Plan of Care/Certification Expiration Date: 24    Frequency/Duration:   Plan Frequency: 1-2x/week for 60 days      Time In/Out:   Time In: 1435  Time Out: 1525      PT Visit Info:      Visit Count:  6    OUTPATIENT PHYSICAL THERAPY:   Treatment Note 2024       Episode  (B shoulder and neck pain)               Treatment Diagnosis:    Cervical radiculopathy  Acute pain of right shoulder  Acute pain of left shoulder  Medical/Referring Diagnosis:    Bilateral shoulder and neck  Referring Physician:  Lalo Gonzales Jr., MD MD Orders:  eval & treat, home exercise program, strengthening, range of motion, traction, dry needling, deep massage, and all modalities   Return MD Appt:  5-15-24   Date of Onset:  Onset Date:  (Chronic)     Allergies:   Latex, Diphenhydramine, Oxcarbazepine, Alirocumab, Ezetimibe, Iodinated contrast media, and Statins  Restrictions/Precautions:   None      Interventions Planned (Treatment may consist of any combination of the following):  Current Treatment Recommendations: Strengthening; ROM; Dry needling; Home exercise program; Manual; Modalities      Subjective Comments: Patient reports she felt better after the dry needling. She was reaching under desk for something and had pain in the R shoulder. L shoulder ache into arm.     Initial Pain Level::     5 /10  Post Session Pain Level:        Sore  Medications Last Reviewed:  2024  Updated Objective Findings:  None Today  Treatment   MECHANICAL TRACTION: (12 minutes):   Traction was used due to the patient's cervical radiculopathy in order to

## 2024-05-09 ENCOUNTER — APPOINTMENT (OUTPATIENT)
Dept: PHYSICAL THERAPY | Age: 69
End: 2024-05-09
Attending: ORTHOPAEDIC SURGERY
Payer: MEDICARE

## 2024-05-10 ENCOUNTER — HOSPITAL ENCOUNTER (OUTPATIENT)
Dept: PHYSICAL THERAPY | Age: 69
Setting detail: RECURRING SERIES
Discharge: HOME | End: 2024-05-13
Attending: ORTHOPAEDIC SURGERY
Payer: MEDICARE

## 2024-05-10 PROCEDURE — 97012 MECHANICAL TRACTION THERAPY: CPT

## 2024-05-10 PROCEDURE — 97110 THERAPEUTIC EXERCISES: CPT

## 2024-05-10 NOTE — PROGRESS NOTES
Candi Becerra  : 1955  Primary: Medicare Part A And B (Medicare)  Secondary: [employer] HE[employer] C[employer] MEDICARE SUPP [facility] [address]  [place] [address]  Phone: [phone]  Fax: [phone] Plan Frequency: 1-2x/week for 60 days    Plan of Care/Certification Expiration Date: 24        Plan of Care/Certification Expiration Date:  Plan of Care/Certification Expiration Date: 24    Frequency/Duration:   Plan Frequency: 1-2x/week for 60 days      Time In/Out:   Time In: 1220  Time Out: 1320      PT Visit Info:      Visit Count:  7    OUTPATIENT PHYSICAL THERAPY:   Treatment Note 5/10/2024       Episode  (B shoulder and neck pain)               Treatment Diagnosis:    Cervical radiculopathy  Acute pain of right shoulder  Acute pain of left shoulder  Medical/Referring Diagnosis:    Bilateral shoulder and neck  Referring Physician:  Lalo Gonzales Jr., MD MD Orders:  eval & treat, home exercise program, strengthening, range of motion, traction, dry needling, deep massage, and all modalities   Return MD Appt:  5-15-24   Date of Onset:  Onset Date:  (Chronic)     Allergies:   Latex, Diphenhydramine, Oxcarbazepine, Alirocumab, Ezetimibe, Iodinated contrast media, and Statins  Restrictions/Precautions:   None      Interventions Planned (Treatment may consist of any combination of the following):  Current Treatment Recommendations: Strengthening; ROM; Dry needling; Home exercise program; Manual; Modalities      Subjective Comments: Patient reports the left shoulder feels the worse.     Initial Pain Level::     5 /10  Post Session Pain Level:        Sore  Medications Last Reviewed:  5/10/2024  Updated Objective Findings:  None Today  Treatment   MECHANICAL TRACTION: (17 minutes):   Traction was used due to the patient's cervical radiculopathy in order to relieve pain in or originating from his spine. Cervical traction 3x5 min static hold. 18-20 lbs max weight

## 2024-05-14 ENCOUNTER — HOSPITAL ENCOUNTER (OUTPATIENT)
Dept: PHYSICAL THERAPY | Age: 69
Setting detail: RECURRING SERIES
Discharge: HOME | End: 2024-05-17
Attending: ORTHOPAEDIC SURGERY
Payer: MEDICARE

## 2024-05-14 PROCEDURE — 97110 THERAPEUTIC EXERCISES: CPT

## 2024-05-14 PROCEDURE — 97012 MECHANICAL TRACTION THERAPY: CPT

## 2024-05-14 NOTE — PROGRESS NOTES
Candi Becerra  : 1955  Primary: Medicare Part A And B (Medicare)  Secondary: [employer] HE[employer] C[employer] MEDICARE SUPP [facility] [address]  [place] [address]  Phone: [phone]  Fax: [phone] Plan Frequency: 1-2x/week for 60 days    Plan of Care/Certification Expiration Date: 24        Plan of Care/Certification Expiration Date:  Plan of Care/Certification Expiration Date: 24    Frequency/Duration:   Plan Frequency: 1-2x/week for 60 days      Time In/Out:   Time In: 1300  Time Out: 1345      PT Visit Info:      Visit Count:  8    OUTPATIENT PHYSICAL THERAPY:   Treatment Note 2024       Episode  (B shoulder and neck pain)               Treatment Diagnosis:    Cervical radiculopathy  Acute pain of right shoulder  Acute pain of left shoulder  Medical/Referring Diagnosis:    Bilateral shoulder and neck  Referring Physician:  Lalo Gonzales Jr., MD MD Orders:  eval & treat, home exercise program, strengthening, range of motion, traction, dry needling, deep massage, and all modalities   Return MD Appt:  5-15-24   Date of Onset:  Onset Date:  (Chronic)     Allergies:   Latex, Diphenhydramine, Oxcarbazepine, Alirocumab, Ezetimibe, Iodinated contrast media, and Statins  Restrictions/Precautions:   None      Interventions Planned (Treatment may consist of any combination of the following):  Current Treatment Recommendations: Strengthening; ROM; Dry needling; Home exercise program; Manual; Modalities      Subjective Comments: Patient reports the left shoulder is achy and the right shoulder hurt when she reached forward.     Initial Pain Level::     5 10  Post Session Pain Level:        Sore  Medications Last Reviewed:  2024  Updated Objective Findings:  None Today  Treatment   MECHANICAL TRACTION: (12 minutes):   Traction was used due to the patient's cervical radiculopathy in order to relieve pain in or originating from his spine. Cervical traction

## 2024-05-16 ENCOUNTER — HOSPITAL ENCOUNTER (OUTPATIENT)
Dept: PHYSICAL THERAPY | Age: 69
Setting detail: RECURRING SERIES
Discharge: HOME | End: 2024-05-19
Attending: ORTHOPAEDIC SURGERY
Payer: MEDICARE

## 2024-05-16 PROCEDURE — 97012 MECHANICAL TRACTION THERAPY: CPT

## 2024-05-16 PROCEDURE — 97110 THERAPEUTIC EXERCISES: CPT

## 2024-05-16 NOTE — PROGRESS NOTES
Candi Becerra  : 1955  Primary: Medicare Part A And B (Medicare)  Secondary: [employer] HE[employer] C[employer] MEDICARE SUPP [facility] [address]  [place] [address]  Phone: [phone]  Fax: [phone] Plan Frequency: 1-2x/week for 60 days    Plan of Care/Certification Expiration Date: 24        Plan of Care/Certification Expiration Date:  Plan of Care/Certification Expiration Date: 24    Frequency/Duration:   Plan Frequency: 1-2x/week for 60 days      Time In/Out:   Time In: 1300  Time Out: 1345      PT Visit Info:      Visit Count:  9    OUTPATIENT PHYSICAL THERAPY:   Treatment Note 2024       Episode  (B shoulder and neck pain)               Treatment Diagnosis:    Cervical radiculopathy  Acute pain of right shoulder  Acute pain of left shoulder  Medical/Referring Diagnosis:    Bilateral shoulder and neck  Referring Physician:  Lalo Gonzales Jr., MD MD Orders:  eval & treat, home exercise program, strengthening, range of motion, traction, dry needling, deep massage, and all modalities   Return MD Appt:  5-15-24   Date of Onset:  Onset Date:  (Chronic)     Allergies:   Latex, Diphenhydramine, Oxcarbazepine, Alirocumab, Ezetimibe, Iodinated contrast media, and Statins  Restrictions/Precautions:   None      Interventions Planned (Treatment may consist of any combination of the following):  Current Treatment Recommendations: Strengthening; ROM; Dry needling; Home exercise program; Manual; Modalities      Subjective Comments: Patient reports the left shoulder is achy. The shoulders are feeling better and now feels more pain in her neck. See pain management May 29th.    Initial Pain Level::     5 /10  Post Session Pain Level:        Sore  Medications Last Reviewed:  2024  Updated Objective Findings:  None Today  Treatment   MECHANICAL TRACTION: (12 minutes):   Traction was used due to the patient's cervical radiculopathy in order to relieve pain in or

## 2024-05-18 SDOH — ECONOMIC STABILITY: HOUSING INSECURITY
IN THE LAST 12 MONTHS, WAS THERE A TIME WHEN YOU DID NOT HAVE A STEADY PLACE TO SLEEP OR SLEPT IN A SHELTER (INCLUDING NOW)?: NO

## 2024-05-18 SDOH — ECONOMIC STABILITY: FOOD INSECURITY: WITHIN THE PAST 12 MONTHS, THE FOOD YOU BOUGHT JUST DIDN'T LAST AND YOU DIDN'T HAVE MONEY TO GET MORE.: NEVER TRUE

## 2024-05-18 SDOH — ECONOMIC STABILITY: TRANSPORTATION INSECURITY
IN THE PAST 12 MONTHS, HAS LACK OF TRANSPORTATION KEPT YOU FROM MEETINGS, WORK, OR FROM GETTING THINGS NEEDED FOR DAILY LIVING?: NO

## 2024-05-18 SDOH — ECONOMIC STABILITY: FOOD INSECURITY: WITHIN THE PAST 12 MONTHS, YOU WORRIED THAT YOUR FOOD WOULD RUN OUT BEFORE YOU GOT MONEY TO BUY MORE.: NEVER TRUE

## 2024-05-18 SDOH — ECONOMIC STABILITY: INCOME INSECURITY: HOW HARD IS IT FOR YOU TO PAY FOR THE VERY BASICS LIKE FOOD, HOUSING, MEDICAL CARE, AND HEATING?: NOT HARD AT ALL

## 2024-05-21 ENCOUNTER — HOSPITAL ENCOUNTER (OUTPATIENT)
Dept: PHYSICAL THERAPY | Age: 69
Setting detail: RECURRING SERIES
Discharge: HOME | End: 2024-05-24
Attending: ORTHOPAEDIC SURGERY
Payer: MEDICARE

## 2024-05-21 ENCOUNTER — OFFICE VISIT (OUTPATIENT)
Dept: PRIMARY CARE CLINIC | Facility: CLINIC | Age: 69
End: 2024-05-21
Payer: MEDICARE

## 2024-05-21 VITALS
SYSTOLIC BLOOD PRESSURE: 128 MMHG | WEIGHT: 184.2 LBS | HEART RATE: 69 BPM | HEIGHT: 66 IN | OXYGEN SATURATION: 96 % | BODY MASS INDEX: 29.6 KG/M2 | DIASTOLIC BLOOD PRESSURE: 74 MMHG

## 2024-05-21 DIAGNOSIS — F41.8 DEPRESSION WITH ANXIETY: ICD-10-CM

## 2024-05-21 DIAGNOSIS — M85.80 OSTEOPENIA, UNSPECIFIED LOCATION: ICD-10-CM

## 2024-05-21 DIAGNOSIS — Z00.00 MEDICARE ANNUAL WELLNESS VISIT, SUBSEQUENT: ICD-10-CM

## 2024-05-21 DIAGNOSIS — M85.89 OTHER SPECIFIED DISORDERS OF BONE DENSITY AND STRUCTURE, MULTIPLE SITES: ICD-10-CM

## 2024-05-21 DIAGNOSIS — E78.2 MIXED HYPERLIPIDEMIA: ICD-10-CM

## 2024-05-21 DIAGNOSIS — F10.90 HABITUAL ALCOHOL USE: ICD-10-CM

## 2024-05-21 DIAGNOSIS — M54.2 CERVICALGIA: ICD-10-CM

## 2024-05-21 DIAGNOSIS — E03.9 HYPOTHYROIDISM, UNSPECIFIED TYPE: ICD-10-CM

## 2024-05-21 DIAGNOSIS — Z12.31 SCREENING MAMMOGRAM FOR BREAST CANCER: ICD-10-CM

## 2024-05-21 DIAGNOSIS — Z00.00 MEDICARE ANNUAL WELLNESS VISIT, SUBSEQUENT: Primary | ICD-10-CM

## 2024-05-21 LAB
25(OH)D3 SERPL-MCNC: 35 NG/ML (ref 30–100)
ALBUMIN SERPL-MCNC: 3.9 G/DL (ref 3.2–4.6)
ALBUMIN/GLOB SERPL: 1.4 (ref 1–1.9)
ALP SERPL-CCNC: 79 U/L (ref 35–104)
ALT SERPL-CCNC: 8 U/L (ref 12–65)
ANION GAP SERPL CALC-SCNC: 12 MMOL/L (ref 9–18)
AST SERPL-CCNC: 24 U/L (ref 15–37)
BASOPHILS # BLD: 0.1 K/UL (ref 0–0.2)
BASOPHILS NFR BLD: 1 % (ref 0–2)
BILIRUB SERPL-MCNC: 0.6 MG/DL (ref 0–1.2)
BUN SERPL-MCNC: 16 MG/DL (ref 8–23)
CALCIUM SERPL-MCNC: 9.4 MG/DL (ref 8.8–10.2)
CHLORIDE SERPL-SCNC: 95 MMOL/L (ref 98–107)
CO2 SERPL-SCNC: 26 MMOL/L (ref 20–28)
CREAT SERPL-MCNC: 0.83 MG/DL (ref 0.6–1.1)
DIFFERENTIAL METHOD BLD: NORMAL
EOSINOPHIL # BLD: 0.3 K/UL (ref 0–0.8)
EOSINOPHIL NFR BLD: 6 % (ref 0.5–7.8)
ERYTHROCYTE [DISTWIDTH] IN BLOOD BY AUTOMATED COUNT: 12.3 % (ref 11.9–14.6)
GLOBULIN SER CALC-MCNC: 2.9 G/DL (ref 2.3–3.5)
GLUCOSE SERPL-MCNC: 99 MG/DL (ref 70–99)
HCT VFR BLD AUTO: 43.6 % (ref 35.8–46.3)
HGB BLD-MCNC: 14.7 G/DL (ref 11.7–15.4)
IMM GRANULOCYTES # BLD AUTO: 0 K/UL (ref 0–0.5)
IMM GRANULOCYTES NFR BLD AUTO: 0 % (ref 0–5)
LYMPHOCYTES # BLD: 1.9 K/UL (ref 0.5–4.6)
LYMPHOCYTES NFR BLD: 33 % (ref 13–44)
MCH RBC QN AUTO: 31.4 PG (ref 26.1–32.9)
MCHC RBC AUTO-ENTMCNC: 33.7 G/DL (ref 31.4–35)
MCV RBC AUTO: 93.2 FL (ref 82–102)
MONOCYTES # BLD: 0.4 K/UL (ref 0.1–1.3)
MONOCYTES NFR BLD: 6 % (ref 4–12)
NEUTS SEG # BLD: 3.1 K/UL (ref 1.7–8.2)
NEUTS SEG NFR BLD: 54 % (ref 43–78)
NRBC # BLD: 0 K/UL (ref 0–0.2)
PLATELET # BLD AUTO: 255 K/UL (ref 150–450)
PMV BLD AUTO: 9.7 FL (ref 9.4–12.3)
POTASSIUM SERPL-SCNC: 4.8 MMOL/L (ref 3.5–5.1)
PROT SERPL-MCNC: 6.8 G/DL (ref 6.3–8.2)
SODIUM SERPL-SCNC: 133 MMOL/L (ref 136–145)
TSH, 3RD GENERATION: 0.76 UIU/ML (ref 0.27–4.2)
WBC # BLD AUTO: 5.8 K/UL (ref 4.3–11.1)

## 2024-05-21 PROCEDURE — 3017F COLORECTAL CA SCREEN DOC REV: CPT | Performed by: FAMILY MEDICINE

## 2024-05-21 PROCEDURE — 97110 THERAPEUTIC EXERCISES: CPT

## 2024-05-21 PROCEDURE — G0439 PPPS, SUBSEQ VISIT: HCPCS | Performed by: FAMILY MEDICINE

## 2024-05-21 PROCEDURE — 97140 MANUAL THERAPY 1/> REGIONS: CPT

## 2024-05-21 PROCEDURE — 1123F ACP DISCUSS/DSCN MKR DOCD: CPT | Performed by: FAMILY MEDICINE

## 2024-05-21 SDOH — ECONOMIC STABILITY: FOOD INSECURITY: WITHIN THE PAST 12 MONTHS, THE FOOD YOU BOUGHT JUST DIDN'T LAST AND YOU DIDN'T HAVE MONEY TO GET MORE.: NEVER TRUE

## 2024-05-21 SDOH — ECONOMIC STABILITY: FOOD INSECURITY: WITHIN THE PAST 12 MONTHS, YOU WORRIED THAT YOUR FOOD WOULD RUN OUT BEFORE YOU GOT MONEY TO BUY MORE.: NEVER TRUE

## 2024-05-21 SDOH — ECONOMIC STABILITY: INCOME INSECURITY: HOW HARD IS IT FOR YOU TO PAY FOR THE VERY BASICS LIKE FOOD, HOUSING, MEDICAL CARE, AND HEATING?: NOT HARD AT ALL

## 2024-05-21 ASSESSMENT — ENCOUNTER SYMPTOMS
SHORTNESS OF BREATH: 0
VOMITING: 0
DIARRHEA: 0
COUGH: 0
NAUSEA: 0
ABDOMINAL PAIN: 0

## 2024-05-21 ASSESSMENT — LIFESTYLE VARIABLES
HOW OFTEN DURING THE LAST YEAR HAVE YOU BEEN UNABLE TO REMEMBER WHAT HAPPENED THE NIGHT BEFORE BECAUSE YOU HAD BEEN DRINKING: NEVER
HOW OFTEN DURING THE LAST YEAR HAVE YOU FAILED TO DO WHAT WAS NORMALLY EXPECTED FROM YOU BECAUSE OF DRINKING: NEVER
HOW OFTEN DURING THE LAST YEAR HAVE YOU NEEDED AN ALCOHOLIC DRINK FIRST THING IN THE MORNING TO GET YOURSELF GOING AFTER A NIGHT OF HEAVY DRINKING: NEVER
HAS A RELATIVE, FRIEND, DOCTOR, OR ANOTHER HEALTH PROFESSIONAL EXPRESSED CONCERN ABOUT YOUR DRINKING OR SUGGESTED YOU CUT DOWN: NO
HOW OFTEN DO YOU HAVE A DRINK CONTAINING ALCOHOL: 4 OR MORE TIMES A WEEK
HOW MANY STANDARD DRINKS CONTAINING ALCOHOL DO YOU HAVE ON A TYPICAL DAY: 5 OR 6
HOW OFTEN DURING THE LAST YEAR HAVE YOU HAD A FEELING OF GUILT OR REMORSE AFTER DRINKING: NEVER
HAVE YOU OR SOMEONE ELSE BEEN INJURED AS A RESULT OF YOUR DRINKING: NO
HOW OFTEN DURING THE LAST YEAR HAVE YOU FOUND THAT YOU WERE NOT ABLE TO STOP DRINKING ONCE YOU HAD STARTED: NEVER

## 2024-05-21 ASSESSMENT — PATIENT HEALTH QUESTIONNAIRE - PHQ9
2. FEELING DOWN, DEPRESSED OR HOPELESS: NOT AT ALL
1. LITTLE INTEREST OR PLEASURE IN DOING THINGS: NOT AT ALL
SUM OF ALL RESPONSES TO PHQ QUESTIONS 1-9: 0
SUM OF ALL RESPONSES TO PHQ9 QUESTIONS 1 & 2: 0
SUM OF ALL RESPONSES TO PHQ QUESTIONS 1-9: 0

## 2024-05-21 NOTE — ASSESSMENT & PLAN NOTE
Found on last DEXA scan in [place], will see if we can pull it over on Care Everywhere and will order Vitamin D

## 2024-05-21 NOTE — ASSESSMENT & PLAN NOTE
Did not tolerate statins, Zetia or repatha in the past.  Getting a Leqvio infusion per cardiology to see if she tolerates this.  Does not remember trying Tricor in the past, can consider this if she does not tolerate Leqvio.

## 2024-05-21 NOTE — PROGRESS NOTES
Candi Becerra  : 1955  Primary: Medicare Part A And B (Medicare)  Secondary: [employer] HE[employer] C[employer] MEDICARE SUPP [facility] [address] [facility] [address]  Phone: [phone]  Fax: [phone] Plan Frequency: 1-2x/week for 60 days    Plan of Care/Certification Expiration Date: 24        Plan of Care/Certification Expiration Date:  Plan of Care/Certification Expiration Date: 24    Frequency/Duration:   Plan Frequency: 1-2x/week for 60 days      Time In/Out:   Time In: 1300  Time Out: 1348      PT Visit Info:        Visit Count:  10                OUTPATIENT PHYSICAL THERAPY:             Initial Assessment 2024               Episode (B shoulder and neck pain)         Treatment Diagnosis:     Cervical radiculopathy  Acute pain of right shoulder  Acute pain of left shoulder  Medical/Referring Diagnosis:    Bilaterals shoulder, neck  Referring Physician:  Lalo Gonzales Jr., MD MD Orders:  eval & treat, home exercise program, strengthening, range of motion, traction, dry needling, deep massage, and all modalities   Return MD Appt:  5-15-24  Date of Onset:    6+ months  Allergies:  Latex, Diphenhydramine, Oxcarbazepine, Alirocumab, Barium-containing compounds, Ezetimibe, Iodinated contrast media, and Statins  Restrictions/Precautions:    None      Medications Last Reviewed:  2024     OBJECTIVE   Observations:  Pt sits with rounded shoulders and forward head posture.     Cervical:  Pain with cervical rotation to the L 5/10,  No pain with shoulder flexion AROM, pain with ER and reaching behind back    Subjective: Pt reports after cleaning her shower this weekend she had the radiating pain down both arms.     ASSESSMENT   Initial Assessment:  Candi Becerra presents to physical therapy with complaints of bilateral shoulder and neck pain with no known injury. Improved R shoulder AROM with less pain with movement. She continues to report radiating 
AROM with less pain with movement reported.   Communication/Consultation:  none  Equipment provided today:  HEP Access Code [email or web address]   Recommendations/Intent for next treatment session: Next visit will focus on cervical and thoracic spine mobility, nerve mobilizations, Continue cervical traction, progress UE scapular and rotator cuff strengthening    >Total Treatment Billable Duration:  40 minutes  Time In: 1300  Time Out: 1348    Magalys Blanchard, PT         Charge Capture  [employer] Portal  Appt Desk     Future Appointments   Date Time Provider Department Center   5/23/2024 12:30 PM Jazmín Gaitan, PTA [facility]   5/28/2024  1:45 PM Magalys Blanchard, PT [facility]   5/29/2024  2:00 PM Nicolas Ortega MD PM [facility]   5/30/2024  1:45 PM Magalys Blanchard, PT [facility]   6/5/2024  2:45 PM Lalo Gonzales Jr., MD [facility]   7/19/2024  1:00 PM Jm Terry III, MD [facility]   11/21/2024 11:00 AM Lata Evans DO [facility]

## 2024-05-21 NOTE — ASSESSMENT & PLAN NOTE
Taking Citalopram and PRN Xanax.  Using alcohol for increased stress.  Discussed cutting back on this, encouraged healthy eating and regular exercise.

## 2024-05-21 NOTE — PROGRESS NOTES
[facility]  Latanilda Evans, DO  [address], [place] [address] [phone]         Medicare Annual Wellness Visit    Candi Becerra is here for 3 Month Follow-Up (Patient is due for a Wellness Visit. Pt inquired about Mammogram. ) and Blood Work (Patient is fasting for labs. )    Assessment & Plan   Medicare annual wellness visit, subsequent  -     Comprehensive Metabolic Panel; Future  -     CBC with Auto Differential; Future  -     Vitamin D 25 Hydroxy; Future  -     TSH; Future  -     JJ DIGITAL SCREEN W OR WO CAD BILATERAL; Future  Hypothyroidism, unspecified type  Assessment & Plan:  Will check TSH today.  Orders:  -     CBC with Auto Differential; Future  -     Vitamin D 25 Hydroxy; Future  -     TSH; Future  Cervicalgia  Assessment & Plan:  Patient without significant neck pain but has some tightness on exam.  Seems to be causing her shoulder issues.  Upcoming appointment for a steroid injection.  Orders:  -     CBC with Auto Differential; Future  -     Vitamin D 25 Hydroxy; Future  Depression with anxiety  Assessment & Plan:   Taking Citalopram and PRN Xanax.  Using alcohol for increased stress.  Discussed cutting back on this, encouraged healthy eating and regular exercise.  Orders:  -     Comprehensive Metabolic Panel; Future  -     CBC with Auto Differential; Future  -     Vitamin D 25 Hydroxy; Future  Osteopenia, unspecified location  Assessment & Plan:   Found on last DEXA scan in [place], will see if we can pull it over on Care Everywhere and will order Vitamin D  Orders:  -     Comprehensive Metabolic Panel; Future  -     CBC with Auto Differential; Future  -     Vitamin D 25 Hydroxy; Future  Other specified disorders of bone density and structure, multiple sites  -     Vitamin D 25 Hydroxy; Future  Habitual alcohol use  Assessment & Plan:  Patient admits to drinking 4-5 drinks per night.  Discussed need to cut back to decrease her risk by

## 2024-05-21 NOTE — ASSESSMENT & PLAN NOTE
Patient admits to drinking 4-5 drinks per night.  Discussed need to cut back to decrease her risk by cutting back to 2 drinks per day, then can cut back to 10 drinks per week.  Will see how she's doing in 6 months.

## 2024-05-21 NOTE — PATIENT INSTRUCTIONS
under license by [employer]. If you have questions about a medical condition or this instruction, always ask your healthcare professional. [employer] disclaims any warranty or liability for your use of this information.         A Healthy Heart: Care Instructions  Overview     Coronary artery disease, also called heart disease, occurs when a substance called plaque builds up in the vessels that supply oxygen-rich blood to your heart muscle. This can narrow the blood vessels and reduce blood flow. A heart attack happens when blood flow is completely blocked. A high-fat diet, smoking, and other factors increase the risk of heart disease.  Your doctor has found that you have a chance of having heart disease. A heart-healthy lifestyle can help keep your heart healthy and prevent heart disease. This lifestyle includes eating healthy, being active, staying at a weight that's healthy for you, and not smoking or using tobacco. It also includes taking medicines as directed, managing other health conditions, and trying to get a healthy amount of sleep.  Follow-up care is a key part of your treatment and safety. Be sure to make and go to all appointments, and call your doctor if you are having problems. It's also a good idea to know your test results and keep a list of the medicines you take.  How can you care for yourself at home?  Diet    Use less salt when you cook and eat. This helps lower your blood pressure. Taste food before salting. Add only a little salt when you think you need it. With time, your taste buds will adjust to less salt.     Eat fewer snack items, fast foods, canned soups, and other high-salt, high-fat, processed foods.     Read food labels and try to avoid saturated and trans fats. They increase your risk of heart disease by raising cholesterol levels.     Limit the amount of solid fat--butter, margarine, and shortening--you eat. Use olive, peanut, or canola oil when you cook. Bake, broil,

## 2024-05-21 NOTE — ASSESSMENT & PLAN NOTE
Patient without significant neck pain but has some tightness on exam.  Seems to be causing her shoulder issues.  Upcoming appointment for a steroid injection.

## 2024-05-23 ENCOUNTER — HOSPITAL ENCOUNTER (OUTPATIENT)
Dept: PHYSICAL THERAPY | Age: 69
Setting detail: RECURRING SERIES
Discharge: HOME | End: 2024-05-26
Attending: ORTHOPAEDIC SURGERY
Payer: MEDICARE

## 2024-05-23 ENCOUNTER — PATIENT MESSAGE (OUTPATIENT)
Dept: PRIMARY CARE CLINIC | Facility: CLINIC | Age: 69
End: 2024-05-23

## 2024-05-23 PROCEDURE — 97140 MANUAL THERAPY 1/> REGIONS: CPT

## 2024-05-23 PROCEDURE — 97110 THERAPEUTIC EXERCISES: CPT

## 2024-05-23 ASSESSMENT — PAIN SCALES - GENERAL: PAINLEVEL_OUTOF10: 6

## 2024-05-23 NOTE — PROGRESS NOTES
single arm B L single 1# 2x10  R 0# 2x10 L single arm 1# 2x10  R 0# 2x10 L single arm 1# 2x10  R 0# 2x10   Diaphragmatic breathing    -      Tennis ball self mob    -      Bilateral shoulder ER - Yellow 2x10  Yellow 2x10 Red 2x10 Red 2x10    R shoulder IR - Yellow 10x  Yellow 2x10 Red 2x10 Red 2x10        Treatment/Session Summary:    Treatment Assessment: Patient did better/less pain with shoulder/scapular exercises today with scapula stabilized by PTA.  Continues to have more R shoulder symptoms with activity.  Assess benefit of taping next visit.    Communication/Consultation:  none  Equipment provided today:  HEP Access Code [email or web address]   Recommendations/Intent for next treatment session: Next visit will focus on cervical and thoracic spine mobility, nerve mobilizations, Continue cervical traction, progress UE scapular and rotator cuff strengthening    >Total Treatment Billable Duration:  50 minutes  Time In: 1230  Time Out: 1320    Jazmín Gaitan PTA         Charge Capture  [employer] Portal  Appt Desk     Future Appointments   Date Time Provider Department Center   5/28/2024  1:45 PM Magalys Blanchard, PT [facility]   5/29/2024  2:00 PM Nicolas Ortega MD PM [facility]   5/30/2024  1:45 PM Magalys Blanchard, PT [facility]   6/5/2024  2:45 PM Lalo Gonzales Jr., MD [facility]   6/27/2024  4:15 PM [facility] ROOM 3 [facility]   7/19/2024  1:00 PM Jm Terry III, MD [facility]   11/21/2024 11:00 AM Ltaa Evans DO [facility]

## 2024-05-24 ENCOUNTER — TELEPHONE (OUTPATIENT)
Dept: PRIMARY CARE CLINIC | Facility: CLINIC | Age: 69
End: 2024-05-24

## 2024-05-24 RX ORDER — CYCLOBENZAPRINE HCL 10 MG
10 TABLET ORAL NIGHTLY PRN
Qty: 10 TABLET | Refills: 0 | Status: SHIPPED | OUTPATIENT
Start: 2024-05-24 | End: 2024-06-03

## 2024-05-24 NOTE — PROGRESS NOTES
Chronic Pain Consult Note      Plan:     A comprehensive pain management plan may consist of the following: Testing, Therapy, Medications, Interventions, Consults, and Follow up.    Chronic bilateral shoulder pain  Scheduled for bilateral intra-articular steroid injection  Patient not a surgical candidate at this time per orthopedics  Cervical stenosis  Potential for interlaminar PATRICIA, C5-6 versus C6-7  Cervical spondylosis without myelopathy  Encouraged continued active healthy lifestyle    General Recommendations: The pain condition that the patient suffers from is best treated with a multidisciplinary approach that involves an increase in physical activity to prevent de-conditioning and worsening of the pain cycle, as well as psychological counseling (formal and/or informal) to address the co morbid psychological effects of pain. Treatment will often involve judicious use of pain medications and interventional procedures to decrease the pain, allowing the patient to participate in the physical activity that will ultimately produce long-lasting pain reductions. The goal of the multidisciplinary approach is to return the patient to a higher level of overall function and to restore their ability to perform activities of daily living.      Referring Provider: Jorge Otto MD  Assessment:      Chief Complaint: New Patient (Shoulders, neck, lower back, hips)      Candi Becerra is a 68 y.o. female being [facility] for the following diagnoses:    Diagnosis:  No diagnosis found.      Subjective:      HPI:  Ms. Becerra is seen in consultation at the request of Jorge Otto MD for evaluation and recommendations regarding the above diagnoses and the below HPI.    HPI on05/29/24: 68-year-old female presents for ration treatment of bilateral neck and shoulder pain.  Patient reports pain has been present for years.  Denies any specific inciting event.  Patient is status post left-sided rotator

## 2024-05-24 NOTE — TELEPHONE ENCOUNTER
Pt called and stated that she was working out in her yard yesterday and has pulled a muscle with the pain radiating down her back into her bottom.  She was unable to sleep last night and would like to know if you would be able to send in a muscle relaxer for her?    If so, please use the [place] drug store on file.    Thank you

## 2024-05-24 NOTE — TELEPHONE ENCOUNTER
Lorranie Savage LPN 5/23/2024 4:12 PM EDT      ----- Message -----  From: Candi Becerra \"Deborah\"  Sent: 5/23/2024 4:08 PM EDT  To: *  Subject: Flexeril for low back spasms     Unbelievably I pulled muscles in my low back/buttocks area bending over doing laundry.   I’ve been utilizing heating pad. Hard to stand, easier to walk but not to go for a walk. Can u order me a script for flexeril please? I did make it to PT for shoulders and neck.

## 2024-05-28 ENCOUNTER — HOSPITAL ENCOUNTER (OUTPATIENT)
Dept: PHYSICAL THERAPY | Age: 69
Setting detail: RECURRING SERIES
Discharge: HOME | End: 2024-05-31
Attending: ORTHOPAEDIC SURGERY
Payer: MEDICARE

## 2024-05-28 PROCEDURE — 97110 THERAPEUTIC EXERCISES: CPT

## 2024-05-28 PROCEDURE — 97140 MANUAL THERAPY 1/> REGIONS: CPT

## 2024-05-28 NOTE — PROGRESS NOTES
Candi Becerra  : 1955  Primary: Medicare Part A And B (Medicare)  Secondary: [employer]ARP HEALTH CARE MEDICARE SUPP [facility] [address]  [facility] [address]  Phone: [phone]  Fax: [phone] Plan Frequency: 1-2x/week for 60 days    Plan of Care/Certification Expiration Date: 24        Plan of Care/Certification Expiration Date:  Plan of Care/Certification Expiration Date: 24    Frequency/Duration:   Plan Frequency: 1-2x/week for 60 days      Time In/Out:   Time In: 1345  Time Out: 1430      PT Visit Info:      Visit Count:  12    OUTPATIENT PHYSICAL THERAPY:   Treatment Note 2024       Episode  (B shoulder and neck pain)               Treatment Diagnosis:    Cervical radiculopathy  Acute pain of right shoulder  Acute pain of left shoulder  Medical/Referring Diagnosis:    Bilateral shoulder and neck  Referring Physician:  Lalo Gonzales Jr., MD MD Orders:  eval & treat, home exercise program, strengthening, range of motion, traction, dry needling, deep massage, and all modalities   Return MD Appt:  5-15-24   Date of Onset:  Onset Date:  (Chronic)     Allergies:   Latex, Diphenhydramine, Oxcarbazepine, Alirocumab, Barium-containing compounds, Ezetimibe, Iodinated contrast media, and Statins  Restrictions/Precautions:   None      Interventions Planned (Treatment may consist of any combination of the following):  Current Treatment Recommendations: Strengthening; ROM; Dry needling; Home exercise program; Manual; Modalities      Subjective Comments: Pt reports she was lifting laundry out of the basket and pulled her low back. She talked to MD and was given some muscle relaxer's  that helped.    Initial Pain Level::      /10  Post Session Pain Level:          not rated  Medications Last Reviewed:  2024  Updated Objective Findings: none today  Treatment   Manual Therapy (10 minutes): For decreased pain. Pt prone and central Pas to Thoracic spine

## 2024-05-29 ENCOUNTER — OFFICE VISIT (OUTPATIENT)
Age: 69
End: 2024-05-29
Payer: MEDICARE

## 2024-05-29 DIAGNOSIS — G89.29 CHRONIC PAIN OF BOTH SHOULDERS: Primary | ICD-10-CM

## 2024-05-29 DIAGNOSIS — M25.511 CHRONIC PAIN OF BOTH SHOULDERS: Primary | ICD-10-CM

## 2024-05-29 DIAGNOSIS — M25.512 CHRONIC PAIN OF BOTH SHOULDERS: Primary | ICD-10-CM

## 2024-05-29 PROCEDURE — 99204 OFFICE O/P NEW MOD 45 MIN: CPT | Performed by: ANESTHESIOLOGY

## 2024-05-29 PROCEDURE — 1123F ACP DISCUSS/DSCN MKR DOCD: CPT | Performed by: ANESTHESIOLOGY

## 2024-05-30 ENCOUNTER — HOSPITAL ENCOUNTER (OUTPATIENT)
Dept: PHYSICAL THERAPY | Age: 69
Setting detail: RECURRING SERIES
End: 2024-05-30
Attending: ORTHOPAEDIC SURGERY
Payer: MEDICARE

## 2024-05-30 PROCEDURE — 97110 THERAPEUTIC EXERCISES: CPT

## 2024-05-30 NOTE — PROGRESS NOTES
Candi Becerra  : 1955  Primary: Medicare Part A And B (Medicare)  Secondary: [employer] HE[employer] C[employer] MEDICARE SUPP [facility] [address]  [place] [address]  Phone: [phone]  Fax: [phone] Plan Frequency: 1-2x/week for 60 days    Plan of Care/Certification Expiration Date: 24        Plan of Care/Certification Expiration Date:  Plan of Care/Certification Expiration Date: 24    Frequency/Duration:   Plan Frequency: 1-2x/week for 60 days      Time In/Out:   Time In: 1340  Time Out: 1430      PT Visit Info:      Visit Count:  13    OUTPATIENT PHYSICAL THERAPY:   Treatment Note 2024       Episode  (B shoulder and neck pain)               Treatment Diagnosis:    Cervical radiculopathy  Acute pain of right shoulder  Acute pain of left shoulder  Medical/Referring Diagnosis:    Bilateral shoulder and neck  Referring Physician:  Lalo Gonzales Jr., MD MD Orders:  eval & treat, home exercise program, strengthening, range of motion, traction, dry needling, deep massage, and all modalities   Return MD Appt:  5-15-24   Date of Onset:  Onset Date:  (Chronic)     Allergies:   Latex, Diphenhydramine, Oxcarbazepine, Alirocumab, Barium-containing compounds, Ezetimibe, Iodinated contrast media, and Statins  Restrictions/Precautions:   None      Interventions Planned (Treatment may consist of any combination of the following):  Current Treatment Recommendations: Strengthening; ROM; Dry needling; Home exercise program; Manual; Modalities      Subjective Comments: Pt reports pain management recommended an injection in each shoulder. Scheduled for .     Initial Pain Level::      /10  Post Session Pain Level:          not rated  Medications Last Reviewed:  2024  Updated Objective Findings: none today  Treatment   THERAPEUTIC EXERCISE: (40 minutes):    Exercises per grid below to improve mobility and coordination.  Required moderate verbal and tactile  strengthening exercises today and she did well with no complaints of pain.  Communication/Consultation:  none  Equipment provided today:  HEP Access Code [email or web address]   Recommendations/Intent for next treatment session: Next visit will focus on cervical and thoracic spine mobility, nerve mobilizations, Continue cervical traction, progress UE scapular and rotator cuff strengthening    >Total Treatment Billable Duration:  40 minutes  Time In: 1340  Time Out: 1430    Magalys Blanchard, PT         Charge Capture  [facility] Portal  Appt Desk     Future Appointments   Date Time Provider Department [place]   6/25/2024  8:15 AM PAIN MGMT PROVIDER [facility] AMB   6/27/2024  4:15 PM SFE [facility] BI ROOM 3 SFERMAM SFE   7/15/2024  1:15 PM Lalo Gonzales Jr., MD POAP [facility] AMB   7/18/2024  2:20 PM Nicolas Ortega MD PM [facility] AMB   7/19/2024  1:00 PM Jm Terry III, MD [facility] AMB   11/21/2024 11:00 AM Lata Evans DO [facility] AMB

## 2024-06-20 PROBLEM — Z00.00 MEDICARE ANNUAL WELLNESS VISIT, SUBSEQUENT: Status: RESOLVED | Noted: 2024-02-19 | Resolved: 2024-06-20

## 2024-06-25 ENCOUNTER — OFFICE VISIT (OUTPATIENT)
Dept: ORTHOPEDIC SURGERY | Age: 69
End: 2024-06-25
Payer: MEDICARE

## 2024-06-25 DIAGNOSIS — M25.511 CHRONIC PAIN OF BOTH SHOULDERS: Primary | ICD-10-CM

## 2024-06-25 DIAGNOSIS — G89.29 CHRONIC PAIN OF BOTH SHOULDERS: Primary | ICD-10-CM

## 2024-06-25 DIAGNOSIS — M25.512 CHRONIC PAIN OF BOTH SHOULDERS: Primary | ICD-10-CM

## 2024-06-25 PROCEDURE — 20610 DRAIN/INJ JOINT/BURSA W/O US: CPT | Performed by: ANESTHESIOLOGY

## 2024-06-25 RX ORDER — METHYLPREDNISOLONE ACETATE 40 MG/ML
40 INJECTION, SUSPENSION INTRA-ARTICULAR; INTRALESIONAL; INTRAMUSCULAR; SOFT TISSUE ONCE
Status: COMPLETED | OUTPATIENT
Start: 2024-06-25 | End: 2024-06-25

## 2024-06-25 RX ADMIN — METHYLPREDNISOLONE ACETATE 40 MG: 40 INJECTION, SUSPENSION INTRA-ARTICULAR; INTRALESIONAL; INTRAMUSCULAR; SOFT TISSUE at 08:09

## 2024-06-25 NOTE — PROGRESS NOTES
NAME: Candi Becerra   ID:[id number]   :1955    Location: POA    Procedure: bilateral lntraarticular Shoulder Injection    Pre-op Diagnosis: 1. Shoulder Pain. 2. Shoulder Osteoarthritis     Post-op Diagnosis: Same    Anesthesia: Local only     Complications: None    After confirming written and informed consent and discussing the risk, benefits and alternatives for the  procedure, the patient had the correct site marked by the physician performing the procedure. The specific risks of bleeding and infection were discussed.    The patient was then placed in the supine position. The skin overlying the left and right shoulder was then prepped with chlorhexidine gluconate and sterile towels were placed to drape the procedure site. A hat and mask were worn, and the hands were cleaned with an alcohol-containing solution. Sterile gloves were then worn. A time out was then performed involving the physician and the patient.    Next, via an anterior approach to the shoulder, a 25 G 1.5 inch needle was advanced into the intra-articular space. Aspiration was negative for effusion fluid or blood. 2 ml of normal saline and Depo-Medrol 40 mg were injected in incremental doses. Same dose of medication and technique was used on the contralateral side.     The needles were withdrawn and Band-Aids were applied. The patient was transported to the recovery room and monitored for an appropriate amount of time, and after meeting discharge criteria, was discharged home with a . No complications were noted through the procedure or recovery period.

## 2024-06-25 NOTE — PATIENT INSTRUCTIONS
POST STEROID PROCEDURE INTRUCTIONS    ? Please drink plenty of fluids and do not skip any meals today.    ? Steroids can take 2-3 days to begin to work and can take 3-4 weeks for full   effectiveness. The local anesthetic lasts only a few hours. Therefore, it is   normal to experience some increased discomfort for the next 2-4 days.     ? Application of a cold compress may help to decrease the discomfort, (ice   covered with a towel, icepack, frozen bag of vegetables, etc.) Do not leave the   ice pack on for more than 10-15 minutes at a time to prevent burning the skin.    ? You may find that moist heat (warm not hot) is helpful after the first day. Again,   never place this directly on your skin. Always place a cloth to prevent burns.    ? You may resume your regular activity gradually as your discomfort subsides.    ? You may take Tylenol (acetaminophen) or Advil (Ibuprofen) for your discomfort   or your usual pain medications, unless contraindicated.    ? If you are diabetic, be aware that steroids can increase your blood sugar.   Please monitor them closely and adjust your medication appropriately or call   your [facility] MD (the doctor who takes care of your diabetes) if your blood   sugar numbers are staying over 250.

## 2024-06-27 ENCOUNTER — HOSPITAL ENCOUNTER (OUTPATIENT)
Dept: MAMMOGRAPHY | Age: 69
Discharge: HOME | End: 2024-06-27
Attending: FAMILY MEDICINE
Payer: MEDICARE

## 2024-06-27 DIAGNOSIS — Z00.00 MEDICARE ANNUAL WELLNESS VISIT, SUBSEQUENT: ICD-10-CM

## 2024-06-27 DIAGNOSIS — Z12.31 SCREENING MAMMOGRAM FOR BREAST CANCER: ICD-10-CM

## 2024-06-27 PROCEDURE — 77063 BREAST TOMOSYNTHESIS BI: CPT

## 2024-07-01 ENCOUNTER — TELEPHONE (OUTPATIENT)
Age: 69
End: 2024-07-01

## 2024-07-01 NOTE — TELEPHONE ENCOUNTER
Called and informed pt of Dr. Terry's response. Suppose its possible, looks like I have an appt w/her in a couple of weeks and we can discuss further then. No further therapy is needed at present.  Pt verb understanding.   Pt states is concerned with BP reading at [facility] yesterday, 131/91. States will attempt to get her BP monitor working and if not will purchase a new one to monitor BP.  Will bring readings in at upcoming appt. (July 17) . Pt states will not get another Leqvio infusion.  Pt will contact our office if symptoms worsen.

## 2024-07-01 NOTE — TELEPHONE ENCOUNTER
Called s/w pt.  Pt report had her first Leqvio infusion on .  States for the past few weeks she has had symptoms of itching on hands, scalp and top of arms, feeling jittery, feeling \"weird in her chest\", has a constant fluttering in her chest.  her daily coffee to 1/2 caffeine and that did not seem to help.  States just doesn't feel right.   Went to publ to check BP/HR yesterday.  BP-136/91 HR-74.

## 2024-07-09 ENCOUNTER — PATIENT MESSAGE (OUTPATIENT)
Dept: PRIMARY CARE CLINIC | Facility: CLINIC | Age: 69
End: 2024-07-09

## 2024-07-09 DIAGNOSIS — F41.9 ANXIETY: Primary | ICD-10-CM

## 2024-07-09 RX ORDER — LEVOTHYROXINE SODIUM 88 UG/1
88 TABLET ORAL DAILY
Qty: 30 TABLET | Refills: 4 | Status: SHIPPED | OUTPATIENT
Start: 2024-07-09

## 2024-07-09 RX ORDER — ALPRAZOLAM 0.25 MG/1
TABLET ORAL
Qty: 30 TABLET | Refills: 0 | Status: SHIPPED | OUTPATIENT
Start: 2024-07-09 | End: 2024-08-08

## 2024-07-09 NOTE — TELEPHONE ENCOUNTER
From: Candi Becerra  To: Dr. Lata Evans  Sent: 7/9/2024 9:09 AM EDT  Subject: Scripts    Hello, leaving soon on 5 week vacation. Would appreciate a new script for levothyroxin 88 mcg one daily.  Also, Xanax, as needed.  FYI I developed low back spasms over the weekend so took muscle relaxer twice and have loosened up and will be stretching to get back to normal.

## 2024-07-15 ENCOUNTER — OFFICE VISIT (OUTPATIENT)
Dept: ORTHOPEDIC SURGERY | Age: 69
End: 2024-07-15
Payer: MEDICARE

## 2024-07-15 DIAGNOSIS — G89.29 CHRONIC LEFT SHOULDER PAIN: Primary | ICD-10-CM

## 2024-07-15 DIAGNOSIS — M25.512 CHRONIC LEFT SHOULDER PAIN: Primary | ICD-10-CM

## 2024-07-15 DIAGNOSIS — M19.011 DEGENERATIVE JOINT DISEASE OF RIGHT ACROMIOCLAVICULAR JOINT: ICD-10-CM

## 2024-07-15 DIAGNOSIS — M47.812 CERVICAL SPONDYLOSIS: ICD-10-CM

## 2024-07-15 DIAGNOSIS — M75.51 BURSITIS OF RIGHT SHOULDER: ICD-10-CM

## 2024-07-15 DIAGNOSIS — M19.012 DEGENERATIVE JOINT DISEASE OF LEFT ACROMIOCLAVICULAR JOINT: ICD-10-CM

## 2024-07-15 PROCEDURE — G8427 DOCREV CUR MEDS BY ELIG CLIN: HCPCS | Performed by: ORTHOPAEDIC SURGERY

## 2024-07-15 PROCEDURE — 1090F PRES/ABSN URINE INCON ASSESS: CPT | Performed by: ORTHOPAEDIC SURGERY

## 2024-07-15 PROCEDURE — G8400 PT W/DXA NO RESULTS DOC: HCPCS | Performed by: ORTHOPAEDIC SURGERY

## 2024-07-15 PROCEDURE — 1036F TOBACCO NON-USER: CPT | Performed by: ORTHOPAEDIC SURGERY

## 2024-07-15 PROCEDURE — 99214 OFFICE O/P EST MOD 30 MIN: CPT | Performed by: ORTHOPAEDIC SURGERY

## 2024-07-15 PROCEDURE — 3017F COLORECTAL CA SCREEN DOC REV: CPT | Performed by: ORTHOPAEDIC SURGERY

## 2024-07-15 PROCEDURE — 1123F ACP DISCUSS/DSCN MKR DOCD: CPT | Performed by: ORTHOPAEDIC SURGERY

## 2024-07-15 PROCEDURE — G8419 CALC BMI OUT NRM PARAM NOF/U: HCPCS | Performed by: ORTHOPAEDIC SURGERY

## 2024-07-15 NOTE — PROGRESS NOTES
Chronic left shoulder pain    2. 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 Bursitis of right shoulder    4. Degenerative joint disease of right acromioclavicular joint    5. Cervical spondylosis       .  Rule out internal derangement right shoulder  Rule out internal derangement left shoulder  Hypothyroidism  Hypercholesterolemia  Obstructive sleep apnea  Coronary artery disease  History of low back surgery    Plan:   I discussed the problem with the patient.  I discussed nonoperative versus operative intervention including injections.  I discussed the indications for operative intervention into her left shoulder.  Depending upon pathology..  We will defer surgery for now.  Will defer further injections for now.  I would like to obtain an MRI of the left shoulder.  I will recheck her back following the MRI of the left shoulder and after she returns from [place] in roughly 6 to 8 weeks    4.  Chronic illness with exacerbation    Follow up: No follow-ups on file.             FERNANDO DESAI JR, MD

## 2024-07-17 ENCOUNTER — OFFICE VISIT (OUTPATIENT)
Age: 69
End: 2024-07-17
Payer: MEDICARE

## 2024-07-17 VITALS
DIASTOLIC BLOOD PRESSURE: 70 MMHG | WEIGHT: 181.6 LBS | HEIGHT: 66 IN | SYSTOLIC BLOOD PRESSURE: 102 MMHG | HEART RATE: 72 BPM | BODY MASS INDEX: 29.18 KG/M2

## 2024-07-17 DIAGNOSIS — E78.49 FAMILIAL HYPERLIPIDEMIA: Primary | ICD-10-CM

## 2024-07-17 DIAGNOSIS — I25.10 CORONARY ARTERY DISEASE INVOLVING NATIVE CORONARY ARTERY OF NATIVE HEART WITHOUT ANGINA PECTORIS: ICD-10-CM

## 2024-07-17 PROCEDURE — 1090F PRES/ABSN URINE INCON ASSESS: CPT | Performed by: INTERNAL MEDICINE

## 2024-07-17 PROCEDURE — G8400 PT W/DXA NO RESULTS DOC: HCPCS | Performed by: INTERNAL MEDICINE

## 2024-07-17 PROCEDURE — 3017F COLORECTAL CA SCREEN DOC REV: CPT | Performed by: INTERNAL MEDICINE

## 2024-07-17 PROCEDURE — 1036F TOBACCO NON-USER: CPT | Performed by: INTERNAL MEDICINE

## 2024-07-17 PROCEDURE — 1123F ACP DISCUSS/DSCN MKR DOCD: CPT | Performed by: INTERNAL MEDICINE

## 2024-07-17 PROCEDURE — G8419 CALC BMI OUT NRM PARAM NOF/U: HCPCS | Performed by: INTERNAL MEDICINE

## 2024-07-17 PROCEDURE — 99214 OFFICE O/P EST MOD 30 MIN: CPT | Performed by: INTERNAL MEDICINE

## 2024-07-17 PROCEDURE — G8428 CUR MEDS NOT DOCUMENT: HCPCS | Performed by: INTERNAL MEDICINE

## 2024-07-17 ASSESSMENT — ENCOUNTER SYMPTOMS
ABDOMINAL PAIN: 0
SHORTNESS OF BREATH: 0

## 2024-07-17 NOTE — PROGRESS NOTES
DO   EPINEPHrine (EPIPEN) 0.3 MG/0.3ML SOAJ injection  2/9/24  Yes Diann Zaman MD   famotidine (PEPCID) 10 MG tablet    Yes Diann Zaman MD   Polyethylene Glycol 3350 (MIRALAX PO) Take 17 g every day by oral route.   Yes Diann Zaman MD   pravastatin (PRAVACHOL) 10 MG tablet Take 2 tablets by mouth daily  Patient not taking: Reported on 5/21/2024 1/15/24   Diann Zaman MD   Coenzyme Q10 (COQ10) 100 MG CAPS Take by mouth  Patient not taking: Reported on 7/17/2024    Diann Zaman MD     Allergies   Allergen Reactions    Latex Itching    Diphenhydramine Other (See Comments)     Other Reaction(s): Benadryl 25 MG Oral Tablet, Other reaction (Intolerance)    Oxcarbazepine Other (See Comments)    Alirocumab     Barium-Containing Compounds Hallucinations    Ezetimibe     Iodinated Contrast Media Other (See Comments)     Contrast Dye intolerance    Statins Rash     Past Medical History:   Diagnosis Date    Bursitis of both hips     CAD (coronary artery disease)     Chronic back pain     Hyperlipidemia     Hypothyroidism     Muscle spasm     Neuropathy     Sciatic nerve disease, unspecified laterality     R>L    Sleep apnea     Tendinitis     bilateral     Past Surgical History:   Procedure Laterality Date    BACK SURGERY  06/2003    FOOT OSTEOTOMY W/ PLANTAR FASCIA RELEASE Right 2006    HYSTERECTOMY, TOTAL ABDOMINAL (CERVIX REMOVED)  1989    ROTATOR CUFF REPAIR  2000    TIBIA FRACTURE SURGERY Right 2014    TONSILLECTOMY  1968     No family history on file.  Social History     Tobacco Use    Smoking status: Never     Passive exposure: Never    Smokeless tobacco: Never   Substance Use Topics    Alcohol use: Yes     Alcohol/week: 7.0 standard drinks of alcohol     Types: 7 Drinks containing 0.5 oz of alcohol per week       ROS:    Review of Systems   Constitutional: Negative for chills, diaphoresis and fever.   HENT:  Negative for hearing loss.    Eyes:  Negative for visual

## 2024-07-18 ENCOUNTER — OFFICE VISIT (OUTPATIENT)
Age: 69
End: 2024-07-18
Payer: MEDICARE

## 2024-07-18 DIAGNOSIS — M48.02 NEUROFORAMINAL STENOSIS OF CERVICAL SPINE: Primary | ICD-10-CM

## 2024-07-18 PROCEDURE — 1123F ACP DISCUSS/DSCN MKR DOCD: CPT | Performed by: ANESTHESIOLOGY

## 2024-07-18 PROCEDURE — 99213 OFFICE O/P EST LOW 20 MIN: CPT | Performed by: ANESTHESIOLOGY

## 2024-07-18 NOTE — PROGRESS NOTES
updated, accurate, and complete. Parts or the entirety of this document were transcribed utilizing voice recognition software. Transcription errors may be present.    Nicolsa Ortega M.D.

## 2024-07-26 ENCOUNTER — PATIENT MESSAGE (OUTPATIENT)
Dept: PRIMARY CARE CLINIC | Facility: CLINIC | Age: 69
End: 2024-07-26

## 2024-07-26 RX ORDER — TIZANIDINE 2 MG/1
2 TABLET ORAL NIGHTLY PRN
Qty: 30 TABLET | Refills: 0 | Status: SHIPPED | OUTPATIENT
Start: 2024-07-26

## 2024-07-27 NOTE — TELEPHONE ENCOUNTER
Armida Knox 7/26/2024 10:58 AM EDT      ----- Message -----  From: Candi Becerra \"Deborah\"  Sent: 7/26/2024 10:56 AM EDT  To: *  Subject: Low back strain (not spasm)     Hello Doctor, on the 2nd day of my vacation July 19 I was helping sister with a task and badly strained low back muscles. I have since traveled to [place] and have been icing, stretching and heating but am not making progress. If u think muscle relaxers will help could u call into [facility] on [address] in [place].   I will be fishing out of a boat come Aug 2 in the [facility] which I am not well enough to do at this point.

## 2024-08-21 ENCOUNTER — TELEPHONE (OUTPATIENT)
Age: 69
End: 2024-08-21

## 2024-08-21 ENCOUNTER — PATIENT MESSAGE (OUTPATIENT)
Dept: PRIMARY CARE CLINIC | Facility: CLINIC | Age: 69
End: 2024-08-21

## 2024-08-21 DIAGNOSIS — K59.09 CHRONIC CONSTIPATION: Primary | ICD-10-CM

## 2024-08-21 NOTE — TELEPHONE ENCOUNTER
Lata with [facility] called stating she has the following concerns :    Pt refusing to have lithium injections due to side effects  Wants to know if Dr Terry would like to try another medication or discharge the patient.    Please call and advise.

## 2024-08-23 NOTE — TELEPHONE ENCOUNTER
Called Lata at [facility]. Informed her that they can discharge patient from center

## 2024-09-08 ENCOUNTER — PATIENT MESSAGE (OUTPATIENT)
Dept: PRIMARY CARE CLINIC | Facility: CLINIC | Age: 69
End: 2024-09-08

## 2024-09-08 DIAGNOSIS — G89.29 CHRONIC BILATERAL LOW BACK PAIN WITHOUT SCIATICA: ICD-10-CM

## 2024-09-08 DIAGNOSIS — M70.72 BURSITIS OF BOTH HIPS, UNSPECIFIED BURSA: Primary | ICD-10-CM

## 2024-09-08 DIAGNOSIS — M54.2 CERVICALGIA: ICD-10-CM

## 2024-09-08 DIAGNOSIS — M70.71 BURSITIS OF BOTH HIPS, UNSPECIFIED BURSA: Primary | ICD-10-CM

## 2024-09-08 DIAGNOSIS — M54.50 CHRONIC BILATERAL LOW BACK PAIN WITHOUT SCIATICA: ICD-10-CM

## 2024-09-12 ENCOUNTER — HOSPITAL ENCOUNTER (OUTPATIENT)
Dept: PHYSICAL THERAPY | Age: 69
Setting detail: RECURRING SERIES
Discharge: HOME | End: 2024-09-15
Attending: FAMILY MEDICINE
Payer: MEDICARE

## 2024-09-12 DIAGNOSIS — M54.59 OTHER LOW BACK PAIN: Primary | ICD-10-CM

## 2024-09-12 DIAGNOSIS — M62.830 MUSCLE SPASM OF BACK: ICD-10-CM

## 2024-09-12 PROCEDURE — 97161 PT EVAL LOW COMPLEX 20 MIN: CPT

## 2024-09-12 PROCEDURE — 97110 THERAPEUTIC EXERCISES: CPT

## 2024-09-19 ENCOUNTER — APPOINTMENT (OUTPATIENT)
Dept: PHYSICAL THERAPY | Age: 69
End: 2024-09-19
Attending: FAMILY MEDICINE
Payer: MEDICARE

## 2024-09-26 ENCOUNTER — HOSPITAL ENCOUNTER (OUTPATIENT)
Dept: PHYSICAL THERAPY | Age: 69
Setting detail: RECURRING SERIES
Discharge: HOME | End: 2024-09-29
Attending: FAMILY MEDICINE
Payer: MEDICARE

## 2024-09-26 PROCEDURE — 97110 THERAPEUTIC EXERCISES: CPT

## 2024-09-26 NOTE — PROGRESS NOTES
Candi Becerra  : 1955  Primary: Medicare Part A And B (Medicare)  Secondary: AARP HEALTH CARE MEDICARE SUPP [facility] [address] [place] [address]  Phone: [phone]  Fax: [phone] Plan Frequency: 1-2x/week for 6 weeks    Plan of Care/Certification Expiration Date: 10/25/24        Plan of Care/Certification Expiration Date:  Plan of Care/Certification Expiration Date: 10/25/24    Frequency/Duration:   Plan Frequency: 1-2x/week for 6 weeks      Time In/Out:   Time In: 1256  Time Out: 1340      PT Visit Info:    Total # of Visits to Date: 2      Visit Count:  2    OUTPATIENT PHYSICAL THERAPY:   Treatment Note 2024       Episode  (low back pain)               Treatment Diagnosis:    Other low back pain  Muscle spasm of back  Medical/Referring Diagnosis:    Bursitis of both hips, unspecified bursa  Chronic bilateral low back pain without sciatica  Cervicalgia      Referring Physician:  Lata Evans DO MD Orders:  PT Eval and Treat   Return MD Appt:  24   Date of Onset:  Onset Date: 24     Allergies:   Latex, Diphenhydramine, Oxcarbazepine, Alirocumab, Barium-containing compounds, Ezetimibe, Iodinated contrast media, and Statins  Restrictions/Precautions:   None      Interventions Planned (Treatment may consist of any combination of the following):  Current Treatment Recommendations: Strengthening; ROM; Manual; Home exercise program    Subjective Comments: Pt reports she wants to try the machines in the gym.    Initial Pain Level::    5  /10  Post Session Pain Level:       5 /10  Medications Last Reviewed:  2024  Updated Objective Findings:  none  Treatment   THERAPEUTIC EXERCISE: (40 minutes):  for LE and core strengthening. Moderate Visual and verbal cueing for body alignment with exercises and .      Date:  24 Date:   Date:     Activity/Exercise Parameters Parameters Parameters   chest press 15#

## 2024-10-15 ENCOUNTER — PATIENT MESSAGE (OUTPATIENT)
Dept: PRIMARY CARE CLINIC | Facility: CLINIC | Age: 69
End: 2024-10-15

## 2024-10-15 DIAGNOSIS — G57.93 NEUROPATHY OF BOTH FEET: Primary | ICD-10-CM

## 2024-10-17 ENCOUNTER — OFFICE VISIT (OUTPATIENT)
Dept: PRIMARY CARE CLINIC | Facility: CLINIC | Age: 69
End: 2024-10-17
Payer: MEDICARE

## 2024-10-17 ENCOUNTER — HOSPITAL ENCOUNTER (OUTPATIENT)
Dept: GENERAL RADIOLOGY | Age: 69
Discharge: HOME | End: 2024-10-20
Payer: MEDICARE

## 2024-10-17 VITALS
HEART RATE: 71 BPM | HEIGHT: 66 IN | BODY MASS INDEX: 29.09 KG/M2 | SYSTOLIC BLOOD PRESSURE: 120 MMHG | OXYGEN SATURATION: 99 % | WEIGHT: 181 LBS | DIASTOLIC BLOOD PRESSURE: 86 MMHG | TEMPERATURE: 97.3 F

## 2024-10-17 DIAGNOSIS — R15.9 INCONTINENCE OF FECES, UNSPECIFIED FECAL INCONTINENCE TYPE: ICD-10-CM

## 2024-10-17 DIAGNOSIS — K90.41 WHEAT INTOLERANCE: ICD-10-CM

## 2024-10-17 DIAGNOSIS — R10.9 ABDOMINAL DISCOMFORT: Primary | ICD-10-CM

## 2024-10-17 DIAGNOSIS — R19.8 ALTERNATING CONSTIPATION AND DIARRHEA: ICD-10-CM

## 2024-10-17 DIAGNOSIS — R10.9 ABDOMINAL DISCOMFORT: ICD-10-CM

## 2024-10-17 DIAGNOSIS — K90.9 MALABSORPTION SYNDROME: ICD-10-CM

## 2024-10-17 DIAGNOSIS — E03.8 OTHER SPECIFIED HYPOTHYROIDISM: ICD-10-CM

## 2024-10-17 DIAGNOSIS — L29.9 ITCHING: ICD-10-CM

## 2024-10-17 DIAGNOSIS — K90.49 MALABSORPTION DUE TO INTOLERANCE, NOT ELSEWHERE CLASSIFIED: ICD-10-CM

## 2024-10-17 DIAGNOSIS — E56.9 HYPOVITAMINOSIS: ICD-10-CM

## 2024-10-17 LAB
25(OH)D3 SERPL-MCNC: 33.9 NG/ML (ref 30–100)
ALBUMIN SERPL-MCNC: 4.3 G/DL (ref 3.2–4.6)
ALBUMIN/GLOB SERPL: 1.3 (ref 1–1.9)
ALP SERPL-CCNC: 97 U/L (ref 35–104)
ALT SERPL-CCNC: 10 U/L (ref 8–45)
ANION GAP SERPL CALC-SCNC: 11 MMOL/L (ref 9–18)
AST SERPL-CCNC: 27 U/L (ref 15–37)
BASOPHILS # BLD: 0.1 K/UL (ref 0–0.2)
BASOPHILS NFR BLD: 1 % (ref 0–2)
BILIRUB SERPL-MCNC: 0.4 MG/DL (ref 0–1.2)
BUN SERPL-MCNC: 20 MG/DL (ref 8–23)
CALCIUM SERPL-MCNC: 10.2 MG/DL (ref 8.8–10.2)
CHLORIDE SERPL-SCNC: 97 MMOL/L (ref 98–107)
CO2 SERPL-SCNC: 28 MMOL/L (ref 20–28)
CREAT SERPL-MCNC: 0.92 MG/DL (ref 0.6–1.1)
DIFFERENTIAL METHOD BLD: ABNORMAL
EOSINOPHIL # BLD: 0.4 K/UL (ref 0–0.8)
EOSINOPHIL NFR BLD: 5 % (ref 0.5–7.8)
ERYTHROCYTE [DISTWIDTH] IN BLOOD BY AUTOMATED COUNT: 11.9 % (ref 11.9–14.6)
ERYTHROCYTE [SEDIMENTATION RATE] IN BLOOD: 6 MM/HR (ref 0–30)
GLOBULIN SER CALC-MCNC: 3.3 G/DL (ref 2.3–3.5)
GLUCOSE SERPL-MCNC: 76 MG/DL (ref 70–99)
HCT VFR BLD AUTO: 47.5 % (ref 35.8–46.3)
HGB BLD-MCNC: 15.8 G/DL (ref 11.7–15.4)
IMM GRANULOCYTES # BLD AUTO: 0 K/UL (ref 0–0.5)
IMM GRANULOCYTES NFR BLD AUTO: 0 % (ref 0–5)
LYMPHOCYTES # BLD: 2.4 K/UL (ref 0.5–4.6)
LYMPHOCYTES NFR BLD: 31 % (ref 13–44)
MCH RBC QN AUTO: 31.7 PG (ref 26.1–32.9)
MCHC RBC AUTO-ENTMCNC: 33.3 G/DL (ref 31.4–35)
MCV RBC AUTO: 95.2 FL (ref 82–102)
MONOCYTES # BLD: 0.5 K/UL (ref 0.1–1.3)
MONOCYTES NFR BLD: 7 % (ref 4–12)
NEUTS SEG # BLD: 4.5 K/UL (ref 1.7–8.2)
NEUTS SEG NFR BLD: 56 % (ref 43–78)
NRBC # BLD: 0 K/UL (ref 0–0.2)
PLATELET # BLD AUTO: 330 K/UL (ref 150–450)
PMV BLD AUTO: 9.7 FL (ref 9.4–12.3)
POTASSIUM SERPL-SCNC: 4.9 MMOL/L (ref 3.5–5.1)
PROT SERPL-MCNC: 7.6 G/DL (ref 6.3–8.2)
RBC # BLD AUTO: 4.99 M/UL (ref 4.05–5.2)
SODIUM SERPL-SCNC: 136 MMOL/L (ref 136–145)
TSH W FREE THYROID IF ABNORMAL: 2.57 UIU/ML (ref 0.27–4.2)
WBC # BLD AUTO: 7.8 K/UL (ref 4.3–11.1)

## 2024-10-17 PROCEDURE — G8419 CALC BMI OUT NRM PARAM NOF/U: HCPCS

## 2024-10-17 PROCEDURE — 1123F ACP DISCUSS/DSCN MKR DOCD: CPT

## 2024-10-17 PROCEDURE — 1090F PRES/ABSN URINE INCON ASSESS: CPT

## 2024-10-17 PROCEDURE — 99215 OFFICE O/P EST HI 40 MIN: CPT

## 2024-10-17 PROCEDURE — 3017F COLORECTAL CA SCREEN DOC REV: CPT

## 2024-10-17 PROCEDURE — 74019 RADEX ABDOMEN 2 VIEWS: CPT

## 2024-10-17 PROCEDURE — G8400 PT W/DXA NO RESULTS DOC: HCPCS

## 2024-10-17 PROCEDURE — G8484 FLU IMMUNIZE NO ADMIN: HCPCS

## 2024-10-17 PROCEDURE — 1036F TOBACCO NON-USER: CPT

## 2024-10-17 PROCEDURE — G8427 DOCREV CUR MEDS BY ELIG CLIN: HCPCS

## 2024-10-17 ASSESSMENT — ENCOUNTER SYMPTOMS
CONSTIPATION: 1
ABDOMINAL PAIN: 1
NAUSEA: 1
ANAL BLEEDING: 0
EYES NEGATIVE: 1
RECTAL PAIN: 0
CHEST TIGHTNESS: 0
BLOOD IN STOOL: 0
DIARRHEA: 1
COUGH: 0
COLOR CHANGE: 0
VOMITING: 0
SHORTNESS OF BREATH: 0
ABDOMINAL DISTENTION: 1

## 2024-10-17 NOTE — ASSESSMENT & PLAN NOTE
Acute on chronic w/ acute worsening    Labs for celiac, as well as for concerns related to malabsorption sequelae, as well as causes, ordered. X ray ordered to assess extent of likely partial sbo. Concern for ischemia component, given h/o CAD & carotid atherosclerosis w/ HLD risk factor. Discussed emergent care if suffers severe abdominal pain to r/o ischemic bowel. Discussed lab work today & gastro next week already scheduled for further assessment of severe GI symptoms impacting quality of life & for colonoscopy due soon. Discussed continuing miralax daily (as has been) until then. Discussed ducolex laxative to clean out once this weekend, since going to stop gluten. Advised not to continue the ducolax unless advised by doc otherwise. F/u scheduled for 11/21/2024 or sooner for acute care needs.

## 2024-10-17 NOTE — PROGRESS NOTES
[facility]  Melinda \"Deann\" SHERON Polo  [address], [place] [address]  [phone]         ASSESSMENT AND PLAN    Problem List Items Addressed This Visit       Hypothyroidism    Abdominal discomfort - Primary     Acute on chronic w/ acute worsening    Labs for celiac, as well as for concerns related to malabsorption sequelae, as well as causes, ordered. X ray ordered to assess extent of likely partial sbo. Concern for ischemia component, given h/o CAD & carotid atherosclerosis w/ HLD risk factor. Discussed emergent care if suffers severe abdominal pain to r/o ischemic bowel. Discussed lab work today & gastro next week already scheduled for further assessment of severe GI symptoms impacting quality of life & for colonoscopy due soon. Discussed continuing miralax daily (as has been) until then. Discussed ducolex laxative to clean out once this weekend, since going to stop gluten. Advised not to continue the ducolax unless advised by doc otherwise. F/u scheduled for 11/21/2024 or sooner for acute care needs.          Relevant Orders    Celiac Ab tTg DGP TIgA    Comprehensive Metabolic Panel w/ Reflex to MG    TSH with Reflex    Sedimentation Rate    CBC with Auto Differential    XR ABDOMEN (2 VIEWS)     Other Visit Diagnoses       Alternating constipation and diarrhea        Relevant Orders    Celiac Ab tTg DGP TIgA    Comprehensive Metabolic Panel w/ Reflex to MG    Vitamin D 25 Hydroxy    TSH with Reflex    Allergen Wheat IgE    Sedimentation Rate    CBC with Auto Differential    XR ABDOMEN (2 VIEWS)    Wheat intolerance        Relevant Orders    Celiac Ab tTg DGP TIgA    Comprehensive Metabolic Panel w/ Reflex to MG    Sedimentation Rate    CBC with Auto Differential    Incontinence of feces, unspecified fecal incontinence type        Relevant Orders    Celiac Ab tTg DGP TIgA    Comprehensive Metabolic Panel w/ Reflex to MG    TSH with Reflex    CBC with Auto

## 2024-10-17 NOTE — PATIENT INSTRUCTIONS
IT WAS GREAT TO MEET YOU IN PERSON TODAY!    WE WILL CONTACT YOU WITH THE RESULTS OF YOUR LABS & IMAGING.    PLEASE CONTINUE TO TAKE ALL MEDICATION AS DISCUSSED/ PRESCRIBED.     DRINK LOTS OF WATER. WEAR YOUR SEATBELT. TRY TO MOVE AROUND APARTMENT TO HELP BOWELS. AS SOON AS LAB WORK DRAWN COMPLETE, CAN CUT GLUTEN FROM DIET.     SEEK EMERGENCY MEDICAL CARE IF DEVELOP SEVERE ABDOMINAL PAIN- COULD BE ISCHEMIC BOWEL OR PERFORATION.    WE WILL SEE YOU AGAIN AT YOUR NEXT APPOINTMENT WITH DR. QUIJANO 11/21/2024 BUT PLEASE SEND SIMI MESSAGE OR CALL WITH CONCERNS [phone]

## 2024-10-19 LAB
IGA SERPL-MCNC: 280 MG/DL (ref 87–352)
TTG IGA SER-ACNC: <2 U/ML (ref 0–3)
TTG IGG SER-ACNC: <2 U/ML (ref 0–5)

## 2024-10-20 LAB — WHEAT IGE QN: 0.26 KU/L

## 2024-10-21 ENCOUNTER — PATIENT MESSAGE (OUTPATIENT)
Dept: PRIMARY CARE CLINIC | Facility: CLINIC | Age: 69
End: 2024-10-21

## 2024-10-21 DIAGNOSIS — K90.9 MALABSORPTION SYNDROME: Primary | ICD-10-CM

## 2024-10-21 DIAGNOSIS — E55.9 VITAMIN D DEFICIENCY: ICD-10-CM

## 2024-10-21 LAB
GLIADIN PEPTIDE IGA SER-ACNC: 3 UNITS (ref 0–19)
GLIADIN PEPTIDE IGG SER-ACNC: 2 UNITS (ref 0–19)
IGA SERPL-MCNC: 280 MG/DL (ref 87–352)
TTG IGA SER-ACNC: <2 U/ML (ref 0–3)
TTG IGG SER-ACNC: <2 U/ML (ref 0–5)

## 2024-10-22 ENCOUNTER — OFFICE VISIT (OUTPATIENT)
Dept: GASTROENTEROLOGY | Age: 69
End: 2024-10-22
Payer: MEDICARE

## 2024-10-22 VITALS
RESPIRATION RATE: 16 BRPM | SYSTOLIC BLOOD PRESSURE: 119 MMHG | TEMPERATURE: 97.3 F | WEIGHT: 185.6 LBS | HEIGHT: 67 IN | BODY MASS INDEX: 29.13 KG/M2 | HEART RATE: 64 BPM | DIASTOLIC BLOOD PRESSURE: 71 MMHG | OXYGEN SATURATION: 97 %

## 2024-10-22 DIAGNOSIS — K59.00 CONSTIPATION, UNSPECIFIED CONSTIPATION TYPE: Primary | ICD-10-CM

## 2024-10-22 PROCEDURE — 1036F TOBACCO NON-USER: CPT | Performed by: STUDENT IN AN ORGANIZED HEALTH CARE EDUCATION/TRAINING PROGRAM

## 2024-10-22 PROCEDURE — 1090F PRES/ABSN URINE INCON ASSESS: CPT | Performed by: STUDENT IN AN ORGANIZED HEALTH CARE EDUCATION/TRAINING PROGRAM

## 2024-10-22 PROCEDURE — 1123F ACP DISCUSS/DSCN MKR DOCD: CPT | Performed by: STUDENT IN AN ORGANIZED HEALTH CARE EDUCATION/TRAINING PROGRAM

## 2024-10-22 PROCEDURE — 3017F COLORECTAL CA SCREEN DOC REV: CPT | Performed by: STUDENT IN AN ORGANIZED HEALTH CARE EDUCATION/TRAINING PROGRAM

## 2024-10-22 PROCEDURE — G8419 CALC BMI OUT NRM PARAM NOF/U: HCPCS | Performed by: STUDENT IN AN ORGANIZED HEALTH CARE EDUCATION/TRAINING PROGRAM

## 2024-10-22 PROCEDURE — G8427 DOCREV CUR MEDS BY ELIG CLIN: HCPCS | Performed by: STUDENT IN AN ORGANIZED HEALTH CARE EDUCATION/TRAINING PROGRAM

## 2024-10-22 PROCEDURE — 99204 OFFICE O/P NEW MOD 45 MIN: CPT | Performed by: STUDENT IN AN ORGANIZED HEALTH CARE EDUCATION/TRAINING PROGRAM

## 2024-10-22 PROCEDURE — G8484 FLU IMMUNIZE NO ADMIN: HCPCS | Performed by: STUDENT IN AN ORGANIZED HEALTH CARE EDUCATION/TRAINING PROGRAM

## 2024-10-22 PROCEDURE — G8400 PT W/DXA NO RESULTS DOC: HCPCS | Performed by: STUDENT IN AN ORGANIZED HEALTH CARE EDUCATION/TRAINING PROGRAM

## 2024-10-22 NOTE — PROGRESS NOTES
Candi Becerra is 68 y.o. y/o female here for initial evaluation.     No procedures noted in the chart review.     XR ABDOMEN (2 VIEWS)  10/2024  IMPRESSION:  1. Moderate gas distention of the stomach.  Minimal to moderate gas distention of several of the visualized small bowel  loops with multiple small air-fluid levels and minimal amount of air in the  colon, suspicious for intestinal obstruction.  Clinical correlation is recommended with follow-up and if indicated CT scan of  the abdomen and pelvis with and without IV contrast is suggested for further  evaluation.     Her last colonoscopy was 4 years ago reportedly had some polyps and recommended to have repeat colonoscopy in 5 years.  She reports that in the last couple years she started having constipation along with gas/bloating.  She is going to bathroom every 2 to 3 days.  Her sister has celiac disease otherwise denies any family history of GI issues.  No blood in the stool.  No weight loss.    Lab Results   Component Value Date    HGB 15.8 (H) 10/17/2024    WBC 7.8 10/17/2024     10/17/2024    MCV 95.2 10/17/2024    CREATININE 0.92 10/17/2024    ALT 10 10/17/2024    AST 27 10/17/2024       Past Medical History:   Diagnosis Date    Bursitis of both hips     CAD (coronary artery disease)     Chronic back pain     Hyperlipidemia     Hypothyroidism     Muscle spasm     Neuropathy     Sciatic nerve disease, unspecified laterality     R>L    Sleep apnea     Tendinitis     bilateral     Past Surgical History:   Procedure Laterality Date    BACK SURGERY  06/2003    FOOT OSTEOTOMY W/ PLANTAR FASCIA RELEASE Right 2006    HYSTERECTOMY, TOTAL ABDOMINAL (CERVIX REMOVED)  1989    ROTATOR CUFF REPAIR  2000    TIBIA FRACTURE SURGERY Right 2014    TONSILLECTOMY  1968     No family history on file.  Social History     Tobacco Use    Smoking status: Never     Passive exposure: Never    Smokeless tobacco: Never   Vaping Use    Vaping status: Never Used   Substance Use

## 2024-10-22 NOTE — PATIENT INSTRUCTIONS
Start taking at least 2 cap Miralax per day and increase the dose as needed to make sure you are having good bowel movements every day. Ok to do full cleansing with the whole bottle of miralax if felt that you are backed up.  In addition to miralax, you can add other things such as magnesium citrate, senna 2 tablets at night.   You can also start taking natural supplement over-the-counter called IBgard (peppermint oil) capsules for gas/bloating/abdominal cramping.   Please call our office at [phone] for giving us an update in 3-4 weeks.

## 2024-11-19 DIAGNOSIS — K59.00 CONSTIPATION, UNSPECIFIED CONSTIPATION TYPE: Primary | ICD-10-CM

## 2024-11-19 RX ORDER — LUBIPROSTONE 8 UG/1
8 CAPSULE ORAL DAILY
Qty: 30 CAPSULE | Refills: 3 | Status: SHIPPED | OUTPATIENT
Start: 2024-11-19

## 2024-11-21 ENCOUNTER — PATIENT MESSAGE (OUTPATIENT)
Dept: PRIMARY CARE CLINIC | Facility: CLINIC | Age: 69
End: 2024-11-21

## 2024-11-21 ENCOUNTER — OFFICE VISIT (OUTPATIENT)
Dept: PRIMARY CARE CLINIC | Facility: CLINIC | Age: 69
End: 2024-11-21
Payer: MEDICARE

## 2024-11-21 VITALS
HEIGHT: 67 IN | HEART RATE: 69 BPM | OXYGEN SATURATION: 95 % | BODY MASS INDEX: 29.03 KG/M2 | TEMPERATURE: 97.8 F | DIASTOLIC BLOOD PRESSURE: 86 MMHG | WEIGHT: 185 LBS | SYSTOLIC BLOOD PRESSURE: 132 MMHG

## 2024-11-21 DIAGNOSIS — K58.1 IRRITABLE BOWEL SYNDROME WITH CONSTIPATION: ICD-10-CM

## 2024-11-21 DIAGNOSIS — F41.9 ANXIETY: Primary | ICD-10-CM

## 2024-11-21 PROCEDURE — G8484 FLU IMMUNIZE NO ADMIN: HCPCS | Performed by: FAMILY MEDICINE

## 2024-11-21 PROCEDURE — 3017F COLORECTAL CA SCREEN DOC REV: CPT | Performed by: FAMILY MEDICINE

## 2024-11-21 PROCEDURE — 1123F ACP DISCUSS/DSCN MKR DOCD: CPT | Performed by: FAMILY MEDICINE

## 2024-11-21 PROCEDURE — G8417 CALC BMI ABV UP PARAM F/U: HCPCS | Performed by: FAMILY MEDICINE

## 2024-11-21 PROCEDURE — G8427 DOCREV CUR MEDS BY ELIG CLIN: HCPCS | Performed by: FAMILY MEDICINE

## 2024-11-21 PROCEDURE — 1090F PRES/ABSN URINE INCON ASSESS: CPT | Performed by: FAMILY MEDICINE

## 2024-11-21 PROCEDURE — 1160F RVW MEDS BY RX/DR IN RCRD: CPT | Performed by: FAMILY MEDICINE

## 2024-11-21 PROCEDURE — 99214 OFFICE O/P EST MOD 30 MIN: CPT | Performed by: FAMILY MEDICINE

## 2024-11-21 PROCEDURE — 1036F TOBACCO NON-USER: CPT | Performed by: FAMILY MEDICINE

## 2024-11-21 PROCEDURE — G8400 PT W/DXA NO RESULTS DOC: HCPCS | Performed by: FAMILY MEDICINE

## 2024-11-21 PROCEDURE — 1159F MED LIST DOCD IN RCRD: CPT | Performed by: FAMILY MEDICINE

## 2024-11-21 RX ORDER — ALPRAZOLAM 0.25 MG/1
0.25 TABLET ORAL 2 TIMES DAILY PRN
Qty: 30 TABLET | Refills: 2 | Status: SHIPPED | OUTPATIENT
Start: 2024-11-21 | End: 2025-01-05

## 2024-11-21 ASSESSMENT — ENCOUNTER SYMPTOMS
ABDOMINAL PAIN: 0
NAUSEA: 1
DIARRHEA: 0
COUGH: 0
VOMITING: 0
ABDOMINAL DISTENTION: 1
SHORTNESS OF BREATH: 0

## 2024-11-21 ASSESSMENT — PATIENT HEALTH QUESTIONNAIRE - PHQ9
5. POOR APPETITE OR OVEREATING: NOT AT ALL
6. FEELING BAD ABOUT YOURSELF - OR THAT YOU ARE A FAILURE OR HAVE LET YOURSELF OR YOUR FAMILY DOWN: NOT AT ALL
SUM OF ALL RESPONSES TO PHQ QUESTIONS 1-9: 0
9. THOUGHTS THAT YOU WOULD BE BETTER OFF DEAD, OR OF HURTING YOURSELF: NOT AT ALL
SUM OF ALL RESPONSES TO PHQ QUESTIONS 1-9: 0
3. TROUBLE FALLING OR STAYING ASLEEP: NOT AT ALL
1. LITTLE INTEREST OR PLEASURE IN DOING THINGS: NOT AT ALL
SUM OF ALL RESPONSES TO PHQ QUESTIONS 1-9: 0
2. FEELING DOWN, DEPRESSED OR HOPELESS: NOT AT ALL
4. FEELING TIRED OR HAVING LITTLE ENERGY: NOT AT ALL
8. MOVING OR SPEAKING SO SLOWLY THAT OTHER PEOPLE COULD HAVE NOTICED. OR THE OPPOSITE, BEING SO FIGETY OR RESTLESS THAT YOU HAVE BEEN MOVING AROUND A LOT MORE THAN USUAL: NOT AT ALL
SUM OF ALL RESPONSES TO PHQ9 QUESTIONS 1 & 2: 0
7. TROUBLE CONCENTRATING ON THINGS, SUCH AS READING THE NEWSPAPER OR WATCHING TELEVISION: NOT AT ALL
10. IF YOU CHECKED OFF ANY PROBLEMS, HOW DIFFICULT HAVE THESE PROBLEMS MADE IT FOR YOU TO DO YOUR WORK, TAKE CARE OF THINGS AT HOME, OR GET ALONG WITH OTHER PEOPLE: NOT DIFFICULT AT ALL
SUM OF ALL RESPONSES TO PHQ QUESTIONS 1-9: 0

## 2024-11-21 NOTE — PROGRESS NOTES
Value Date    TSH 0.756 05/21/2024    TSHELE 2.57 10/17/2024           Lata Evans DO  12:29 PM  11/21/24

## 2024-11-21 NOTE — PATIENT INSTRUCTIONS
IT WAS GREAT TO SEE YOU TODAY!    PLEASE TAKE ALL MEDICATION AS DISCUSSED.    ~USE THE ALPRAZOLAM IF NEEDED TO HELP YOU SLEEP.    ~I TAKE THE FOLLOWING AT NIGHT FOR SLEEP:  MAGNESIUM GLYCINATE 175 MG, ASHWAGANDHA 25 MG, MELATONIN 3 MG AND VITAMIN D3 2000 UNITS.  I TAKE IT RIGHT BEFORE I TURN OUT THE LIGHT TO GO TO BED.     I WILL SEE YOU AGAIN IN 3 MONTHS BUT PLEASE CALL WITH CONCERNS [phone]

## 2024-11-21 NOTE — ASSESSMENT & PLAN NOTE
Chronic issue but worsening, has seen GI who prescribed Amitiza after initially discussed using Miralax daily with low-FODMAP diet.  Discussed with patient sticking with low-FODMAP diet and very slowly introducing foods from the high-FODMAP diet.  Discussed trying the Amitiza first before restarting brand-name Miralax (we think the generic Miralax made her nauseated) and Senna.  If not improving can consider other options like Linzess, can refer back to GI if needed.

## 2024-12-04 ENCOUNTER — HOSPITAL ENCOUNTER (OUTPATIENT)
Dept: NUTRITION | Age: 69
Discharge: HOME | End: 2024-12-07

## 2024-12-04 PROCEDURE — 97802 MEDICAL NUTRITION INDIV IN: CPT

## 2024-12-04 NOTE — PROGRESS NOTES
Maddy Gotti, MS, RD, CSSD, LD  Outpatient Registered Dietitian  [facility]  Phone: [phone] Fax: [phone]    ASSESSMENT  Pt is a 69 y.o. female referred with the following diagnosis (es): No admission diagnoses are documented for this encounter.   PMH includes K59.00 (ICD-10-CM) constipation, unspecified.    Patient stated nutrition goal: Pt has been following a low FODMAPs diet given by referring provider, per pt. Has not experienced and changes to symptoms. Goal stated to have regular bowel activity.    Patient interview history of symptoms:    Pt brought a low FODMAPs handout given by provider and asked for any other information regarding this diet.   Notes she was also advised to discontinue beer intake (wheat/ gluten).   States her sister has celiac disease.   States she has been following the low FODMAP guide for 3 weeks and has no improvement in symptoms.   Reports bloating had been an issue for many years, but that 1 year ago she went to the hospital with a partial blockage that was resolved with magnesium citrate. States that ever since that time she has had continuing issues with bloating and constipation has worsened.   Notes with Miralax use, any effect is unpredictable and will suddenly have urgent need for BM sometimes when not easily accessible to restroom and for that reason she doesn't take Miralax.   States she uses senna and lubiprostone which she describes as not working. May have 1 BM every 1-2 days with hard stools (balls) or may have regular- type of BM but in a small amount (describes as incomplete).   Does describe bloating as improved somewhat.    Labs:   TTG, IgG <2  (reference range:0-5 U/ml)    Meds:   Xanax  Zanaflex  senna  Lubiprostone  Vitamin D    Initial Diet Recall:   Reviewed pt's low FODMAPs food recall which appears consistent with recommendations.    Unable to assess adequacy within this appt. Fiber intake for some meals appears

## 2024-12-12 ENCOUNTER — TELEPHONE (OUTPATIENT)
Dept: GASTROENTEROLOGY | Age: 69
End: 2024-12-12

## 2024-12-12 NOTE — TELEPHONE ENCOUNTER
Pt called in to provide an update on her progress using the low fodmap diet recommended to her by Dr. Burris at 10/22/24 office visit:    \"  Start taking at least 2 cap Miralax per day and increase the dose as needed to make sure you are having good bowel movements every day. Ok to do full cleansing with the whole bottle of miralax if felt that you are backed up.  In addition to miralax, you can add other things such as magnesium citrate, senna 2 tablets at night.   You can also start taking natural supplement over-the-counter called IBgard (peppermint oil) capsules for gas/bloating/abdominal cramping.   Please call our office at [phone] for giving us an update in 3-4 weeks. \"    Pt is using Lubiprostone 8 mcg as well and states that she is still struggling to have a bowel movement. Pt states that she has not been able to reintroduce any high fodmap foods to rule them out as she is still struggling with constipation. Was seen by dietary for food recommendations as well. Pt is questioning why the diet is ineffective and how long will this persist. Advised pt that Dr. Burris is out of the office at this time but we will relay her questions to his PA to determine if she has any recommendations or would prefer to see pt in office.

## 2024-12-13 ENCOUNTER — TELEPHONE (OUTPATIENT)
Dept: GASTROENTEROLOGY | Age: 69
End: 2024-12-13

## 2024-12-13 NOTE — TELEPHONE ENCOUNTER
Called and informed pt of suggestions made from Dr. Burris's Physician Assistant, Souleymane:    \"If she hasn't tried the Linzess 72 mcg (I saw it was prescribed to her by her PCP a few weeks ago), I would stop the Amitiza and try that. The linzess will need to be taken on a completely empty stomach to avoid severe diarrhea. She may have some diarrhea at first as the medication starts to take effect. If she is still having constipation on the Linzess, she can add miralax up to three times a day along with the Linzess, and then cut back on the Miralax once she is at goal of 1-2 bowel movements a day.\"    Pt stated that she is unable to use miralax as it makes her extremely nauseous. Suggested pt to try metamucil instead to increase fiber as alternative to miralax as it should not cause nausea side effects but still be effective as laxative. Pt states that she does have prescription for Linzess but has not had it filled as it was more expensive than Lubiprostone. Pt stated she would continue with Lubiprostone and add metamucil before attempting Linzess. Pt states that she will call back to schedule appointment with PA to discuss bowel movement issues associated with intestines after having made low fodmap diet changes without significant progress to regularity.

## 2024-12-18 ENCOUNTER — PATIENT MESSAGE (OUTPATIENT)
Dept: PRIMARY CARE CLINIC | Facility: CLINIC | Age: 69
End: 2024-12-18

## 2024-12-18 ENCOUNTER — OFFICE VISIT (OUTPATIENT)
Dept: PRIMARY CARE CLINIC | Facility: CLINIC | Age: 69
End: 2024-12-18
Payer: MEDICARE

## 2024-12-18 VITALS
HEART RATE: 64 BPM | BODY MASS INDEX: 28.41 KG/M2 | SYSTOLIC BLOOD PRESSURE: 110 MMHG | DIASTOLIC BLOOD PRESSURE: 74 MMHG | TEMPERATURE: 97.8 F | OXYGEN SATURATION: 98 % | WEIGHT: 181 LBS | HEIGHT: 67 IN

## 2024-12-18 DIAGNOSIS — K58.1 IRRITABLE BOWEL SYNDROME WITH CONSTIPATION: Primary | ICD-10-CM

## 2024-12-18 PROCEDURE — 3017F COLORECTAL CA SCREEN DOC REV: CPT | Performed by: FAMILY MEDICINE

## 2024-12-18 PROCEDURE — G8427 DOCREV CUR MEDS BY ELIG CLIN: HCPCS | Performed by: FAMILY MEDICINE

## 2024-12-18 PROCEDURE — G8400 PT W/DXA NO RESULTS DOC: HCPCS | Performed by: FAMILY MEDICINE

## 2024-12-18 PROCEDURE — 1160F RVW MEDS BY RX/DR IN RCRD: CPT | Performed by: FAMILY MEDICINE

## 2024-12-18 PROCEDURE — G8484 FLU IMMUNIZE NO ADMIN: HCPCS | Performed by: FAMILY MEDICINE

## 2024-12-18 PROCEDURE — 1123F ACP DISCUSS/DSCN MKR DOCD: CPT | Performed by: FAMILY MEDICINE

## 2024-12-18 PROCEDURE — 99214 OFFICE O/P EST MOD 30 MIN: CPT | Performed by: FAMILY MEDICINE

## 2024-12-18 PROCEDURE — G8417 CALC BMI ABV UP PARAM F/U: HCPCS | Performed by: FAMILY MEDICINE

## 2024-12-18 PROCEDURE — 1036F TOBACCO NON-USER: CPT | Performed by: FAMILY MEDICINE

## 2024-12-18 PROCEDURE — 1159F MED LIST DOCD IN RCRD: CPT | Performed by: FAMILY MEDICINE

## 2024-12-18 PROCEDURE — 1090F PRES/ABSN URINE INCON ASSESS: CPT | Performed by: FAMILY MEDICINE

## 2024-12-18 ASSESSMENT — ENCOUNTER SYMPTOMS
ABDOMINAL PAIN: 0
COUGH: 0
SHORTNESS OF BREATH: 0
VOMITING: 0
BLOOD IN STOOL: 0
CONSTIPATION: 1
NAUSEA: 0
DIARRHEA: 0

## 2024-12-18 NOTE — ASSESSMENT & PLAN NOTE
Chronic issue but has not had significant improvement in her constipation despite taking Amitiza and senna daily, as well as following a low-FODMAP diet.  Concerned that there is something wrong.  Wants to consider a colonoscopy.  Will refer back to [facility] to see if they were able to do this prior to her trip in February.  Discussed increasing her Amitiza to 2 pills daily, but will see if insurance will cover Linzess now that she has tried and failed the Amitiza.  Plan to recheck in 2 months.

## 2024-12-18 NOTE — PROGRESS NOTES
[facility]  Lata Evans, DO  [address], [place] [address] [phone]         ASSESSMENT AND PLAN    Problem List Items Addressed This Visit          Digestive    Irritable bowel syndrome with constipation - Primary      Chronic issue but has not had significant improvement in her constipation despite taking Amitiza and senna daily, as well as following a low-FODMAP diet.  Concerned that there is something wrong.  Wants to consider a colonoscopy.  Will refer back to [facility] to see if they were able to do this prior to her trip in February.  Discussed increasing her Amitiza to 2 pills daily, but will see if insurance will cover Linzess now that she has tried and failed the Amitiza.  Plan to recheck in 2 months.         Relevant Medications    linaCLOtide (LINZESS) 72 MCG CAPS capsule    Other Relevant Orders    [facility] - Radha Yanes MD, Gastroenterology, [place]        The diagnoses and plan were discussed with the patient, who verbalizes understanding and agrees with plan.  All questions answered.    Chief Complaint    Chief Complaint   Patient presents with    Follow-up    Discuss Medications    Constipation         HISTORY OF PRESENT ILLNESS    69 y.o. female with anxiety presents today for follow up.  Last seen one month ago, was discussing her difficulties with constipation so we discussed sticking with a low-FODMAP diet and prescribed Linzess.  Was unable to get Linzess covered by insurance until she tried Amitiza.  Has been taking Amitiza with Senna every day and is still not having regular bowel movements.  Has been very good about sticking with a low-FODMAP diet.  States that she had two very small bowel movements today but no bowel movement yesterday.  Denies blood when she uses the bathroom.  States that her first bowel movement was painful so she did not strain, notes that the second one did not hurt but she just passed some

## 2024-12-18 NOTE — PATIENT INSTRUCTIONS
IT WAS GREAT TO SEE YOU TODAY!    I HAVE REFERRED YOU BACK TO DR. DUKES AT GI TO SEE IF THEY CAN DO A DIAGNOSTIC COLONOSCOPY TO FIGURE OUT WHAT IS GOING ON WITH YOUR CONSTIPATION.      PLEASE TAKE ALL MEDICATION AS DISCUSSED.    ~I WILL SEND LINZESS AGAIN SINCE YOU HAVE FAILED AMITIZA WITH SENNA.  IN THE MEANTIME, TAKE TWO OF THE AMITIZA TO SEE IF THIS HELPS.    I WILL SEE YOU AGAIN IN 2 MONTHS BUT PLEASE CALL WITH CONCERNS [phone]

## 2024-12-20 ENCOUNTER — PATIENT MESSAGE (OUTPATIENT)
Dept: PRIMARY CARE CLINIC | Facility: CLINIC | Age: 69
End: 2024-12-20

## 2024-12-20 DIAGNOSIS — K58.1 IRRITABLE BOWEL SYNDROME WITH CONSTIPATION: Primary | ICD-10-CM

## 2024-12-21 RX ORDER — LEVOTHYROXINE SODIUM 88 UG/1
88 TABLET ORAL DAILY
Qty: 30 TABLET | Refills: 4 | Status: SHIPPED | OUTPATIENT
Start: 2024-12-21

## 2025-01-15 RX ORDER — CITALOPRAM HYDROBROMIDE 40 MG/1
40 TABLET ORAL DAILY
Qty: 90 TABLET | Refills: 2 | Status: SHIPPED | OUTPATIENT
Start: 2025-01-15

## 2025-01-28 SDOH — ECONOMIC STABILITY: FOOD INSECURITY: WITHIN THE PAST 12 MONTHS, YOU WORRIED THAT YOUR FOOD WOULD RUN OUT BEFORE YOU GOT MONEY TO BUY MORE.: NEVER TRUE

## 2025-01-28 SDOH — ECONOMIC STABILITY: FOOD INSECURITY: WITHIN THE PAST 12 MONTHS, THE FOOD YOU BOUGHT JUST DIDN'T LAST AND YOU DIDN'T HAVE MONEY TO GET MORE.: NEVER TRUE

## 2025-01-28 SDOH — ECONOMIC STABILITY: INCOME INSECURITY: IN THE LAST 12 MONTHS, WAS THERE A TIME WHEN YOU WERE NOT ABLE TO PAY THE MORTGAGE OR RENT ON TIME?: NO

## 2025-01-28 SDOH — ECONOMIC STABILITY: TRANSPORTATION INSECURITY
IN THE PAST 12 MONTHS, HAS THE LACK OF TRANSPORTATION KEPT YOU FROM MEDICAL APPOINTMENTS OR FROM GETTING MEDICATIONS?: NO

## 2025-01-28 ASSESSMENT — PATIENT HEALTH QUESTIONNAIRE - PHQ9
8. MOVING OR SPEAKING SO SLOWLY THAT OTHER PEOPLE COULD HAVE NOTICED. OR THE OPPOSITE - BEING SO FIDGETY OR RESTLESS THAT YOU HAVE BEEN MOVING AROUND A LOT MORE THAN USUAL: NOT AT ALL
9. THOUGHTS THAT YOU WOULD BE BETTER OFF DEAD, OR OF HURTING YOURSELF: NOT AT ALL
4. FEELING TIRED OR HAVING LITTLE ENERGY: NEARLY EVERY DAY
5. POOR APPETITE OR OVEREATING: NOT AT ALL
SUM OF ALL RESPONSES TO PHQ QUESTIONS 1-9: 4
SUM OF ALL RESPONSES TO PHQ9 QUESTIONS 1 & 2: 0
1. LITTLE INTEREST OR PLEASURE IN DOING THINGS: NOT AT ALL
10. IF YOU CHECKED OFF ANY PROBLEMS, HOW DIFFICULT HAVE THESE PROBLEMS MADE IT FOR YOU TO DO YOUR WORK, TAKE CARE OF THINGS AT HOME, OR GET ALONG WITH OTHER PEOPLE: NOT DIFFICULT AT ALL
3. TROUBLE FALLING OR STAYING ASLEEP: SEVERAL DAYS
7. TROUBLE CONCENTRATING ON THINGS, SUCH AS READING THE NEWSPAPER OR WATCHING TELEVISION: NOT AT ALL
6. FEELING BAD ABOUT YOURSELF - OR THAT YOU ARE A FAILURE OR HAVE LET YOURSELF OR YOUR FAMILY DOWN: NOT AT ALL
7. TROUBLE CONCENTRATING ON THINGS, SUCH AS READING THE NEWSPAPER OR WATCHING TELEVISION: NOT AT ALL
SUM OF ALL RESPONSES TO PHQ QUESTIONS 1-9: 4
SUM OF ALL RESPONSES TO PHQ QUESTIONS 1-9: 4
10. IF YOU CHECKED OFF ANY PROBLEMS, HOW DIFFICULT HAVE THESE PROBLEMS MADE IT FOR YOU TO DO YOUR WORK, TAKE CARE OF THINGS AT HOME, OR GET ALONG WITH OTHER PEOPLE: NOT DIFFICULT AT ALL
3. TROUBLE FALLING OR STAYING ASLEEP: SEVERAL DAYS
2. FEELING DOWN, DEPRESSED OR HOPELESS: NOT AT ALL
9. THOUGHTS THAT YOU WOULD BE BETTER OFF DEAD, OR OF HURTING YOURSELF: NOT AT ALL
1. LITTLE INTEREST OR PLEASURE IN DOING THINGS: NOT AT ALL
SUM OF ALL RESPONSES TO PHQ QUESTIONS 1-9: 4
8. MOVING OR SPEAKING SO SLOWLY THAT OTHER PEOPLE COULD HAVE NOTICED. OR THE OPPOSITE, BEING SO FIGETY OR RESTLESS THAT YOU HAVE BEEN MOVING AROUND A LOT MORE THAN USUAL: NOT AT ALL
5. POOR APPETITE OR OVEREATING: NOT AT ALL
4. FEELING TIRED OR HAVING LITTLE ENERGY: NEARLY EVERY DAY
6. FEELING BAD ABOUT YOURSELF - OR THAT YOU ARE A FAILURE OR HAVE LET YOURSELF OR YOUR FAMILY DOWN: NOT AT ALL
SUM OF ALL RESPONSES TO PHQ QUESTIONS 1-9: 4
2. FEELING DOWN, DEPRESSED OR HOPELESS: NOT AT ALL

## 2025-01-29 ENCOUNTER — OFFICE VISIT (OUTPATIENT)
Dept: PRIMARY CARE CLINIC | Facility: CLINIC | Age: 70
End: 2025-01-29
Payer: MEDICARE

## 2025-01-29 VITALS
HEIGHT: 67 IN | HEART RATE: 80 BPM | DIASTOLIC BLOOD PRESSURE: 84 MMHG | WEIGHT: 176 LBS | OXYGEN SATURATION: 97 % | SYSTOLIC BLOOD PRESSURE: 136 MMHG | BODY MASS INDEX: 27.62 KG/M2 | TEMPERATURE: 97.2 F

## 2025-01-29 DIAGNOSIS — J40 BRONCHITIS: Primary | ICD-10-CM

## 2025-01-29 PROCEDURE — 1036F TOBACCO NON-USER: CPT | Performed by: FAMILY MEDICINE

## 2025-01-29 PROCEDURE — 1090F PRES/ABSN URINE INCON ASSESS: CPT | Performed by: FAMILY MEDICINE

## 2025-01-29 PROCEDURE — 3017F COLORECTAL CA SCREEN DOC REV: CPT | Performed by: FAMILY MEDICINE

## 2025-01-29 PROCEDURE — G8417 CALC BMI ABV UP PARAM F/U: HCPCS | Performed by: FAMILY MEDICINE

## 2025-01-29 PROCEDURE — G8400 PT W/DXA NO RESULTS DOC: HCPCS | Performed by: FAMILY MEDICINE

## 2025-01-29 PROCEDURE — 1159F MED LIST DOCD IN RCRD: CPT | Performed by: FAMILY MEDICINE

## 2025-01-29 PROCEDURE — 99213 OFFICE O/P EST LOW 20 MIN: CPT | Performed by: FAMILY MEDICINE

## 2025-01-29 PROCEDURE — 1123F ACP DISCUSS/DSCN MKR DOCD: CPT | Performed by: FAMILY MEDICINE

## 2025-01-29 PROCEDURE — 1160F RVW MEDS BY RX/DR IN RCRD: CPT | Performed by: FAMILY MEDICINE

## 2025-01-29 PROCEDURE — G8427 DOCREV CUR MEDS BY ELIG CLIN: HCPCS | Performed by: FAMILY MEDICINE

## 2025-01-29 RX ORDER — POLYETHYLENE GLYCOL 3350, SODIUM SULFATE ANHYDROUS, SODIUM BICARBONATE, SODIUM CHLORIDE, POTASSIUM CHLORIDE 236; 22.74; 6.74; 5.86; 2.97 G/4L; G/4L; G/4L; G/4L; G/4L
POWDER, FOR SOLUTION ORAL
COMMUNITY
Start: 2024-12-31

## 2025-01-29 RX ORDER — ALPRAZOLAM 0.25 MG/1
TABLET ORAL
COMMUNITY

## 2025-01-29 RX ORDER — DOXYCYCLINE HYCLATE 100 MG
100 TABLET ORAL 2 TIMES DAILY
Qty: 20 TABLET | Refills: 0 | Status: SHIPPED | OUTPATIENT
Start: 2025-01-29 | End: 2025-02-08

## 2025-01-29 RX ORDER — ALBUTEROL SULFATE 90 UG/1
2 INHALANT RESPIRATORY (INHALATION) 4 TIMES DAILY PRN
Qty: 18 G | Refills: 5 | Status: SHIPPED | OUTPATIENT
Start: 2025-01-29

## 2025-01-29 ASSESSMENT — ENCOUNTER SYMPTOMS
SHORTNESS OF BREATH: 0
ABDOMINAL PAIN: 0
DIARRHEA: 0
VOMITING: 0
CHEST TIGHTNESS: 1
NAUSEA: 0
WHEEZING: 1
RHINORRHEA: 1
COUGH: 1

## 2025-01-29 NOTE — PROGRESS NOTES
[facility]  Lata Evans, DO  [address], [address]  [place] [id number] [phone]         ASSESSMENT AND PLAN    Problem List Items Addressed This Visit          Respiratory    Bronchitis - Primary      Persistent cough for 3-4 weeks, S/P 5-days of antibiotics (suspect Zpak), Medrol Dosepak and Benzonatate.  Lungs clear on exam.  Negative Rapid Flu in the office today (checked due to body aches).  Will treat with Doxycycline and Albuterol.  Encouraged pushing fluids and letting me know if symptoms worsen.             The diagnoses and plan were discussed with the patient, who verbalizes understanding and agrees with plan.  All questions answered.    Chief Complaint    Chief Complaint   Patient presents with    Cough    Nasal Congestion    Chest Congestion    Leg Pain    Lower Back Pain         HISTORY OF PRESENT ILLNESS    69 y.o. female presents today for persistent cough and congestion.  Notes pain in her chest with the cough.  Has been sick for a month.  Notes leg and back is aching.  Has not been moving much due to being sick.  Notes that she is exhausted with any movement.  Had to go to urgent care two weeks, got a steroid pack and Benzonatate pills for cough.  Treated with an antibiotic as well for five days.  Denies fever.    PAST MEDICAL HISTORY    Past Medical History:   Diagnosis Date    Bursitis of both hips     CAD (coronary artery disease)     Chronic back pain     Hyperlipidemia     Hypothyroidism     Muscle spasm     Neuropathy     Sciatic nerve disease, unspecified laterality     R>L    Sleep apnea     Tendinitis     bilateral       PAST SURGICAL HISTORY    Past Surgical History:   Procedure Laterality Date    BACK SURGERY  06/2003    FOOT OSTEOTOMY W/ PLANTAR FASCIA RELEASE Right 2006    HYSTERECTOMY, TOTAL ABDOMINAL (CERVIX REMOVED)  1989    ROTATOR CUFF REPAIR  2000    TIBIA FRACTURE SURGERY Right 2014    TONSILLECTOMY  1968       FAMILY

## 2025-01-29 NOTE — ASSESSMENT & PLAN NOTE
Persistent cough for 3-4 weeks, S/P 5-days of antibiotics (suspect Zpak), Medrol Dosepak and Benzonatate.  Lungs clear on exam.  Negative Rapid Flu in the office today (checked due to body aches).  Will treat with Doxycycline and Albuterol.  Encouraged pushing fluids and letting me know if symptoms worsen.

## 2025-03-14 ENCOUNTER — EMERGENCY VISIT (OUTPATIENT)
Age: 70
End: 2025-03-14

## 2025-03-14 DIAGNOSIS — H02.886: ICD-10-CM

## 2025-03-14 DIAGNOSIS — S05.02XA: ICD-10-CM

## 2025-03-14 DIAGNOSIS — H04.123: ICD-10-CM

## 2025-03-14 DIAGNOSIS — H02.883: ICD-10-CM

## 2025-03-14 PROCEDURE — 92012 INTRM OPH EXAM EST PATIENT: CPT

## 2025-03-14 RX ORDER — SODIUM CHLORIDE 50 MG/G: OINTMENT OPHTHALMIC EVERY EVENING

## 2025-03-14 RX ORDER — MOXIFLOXACIN OPHTHALMIC 5 MG/ML: 1 SOLUTION/ DROPS OPHTHALMIC

## 2025-03-17 ENCOUNTER — FOLLOW UP (OUTPATIENT)
Age: 70
End: 2025-03-17

## 2025-03-17 DIAGNOSIS — H02.886: ICD-10-CM

## 2025-03-17 DIAGNOSIS — S05.02XD: ICD-10-CM

## 2025-03-17 DIAGNOSIS — H04.123: ICD-10-CM

## 2025-03-17 DIAGNOSIS — H02.883: ICD-10-CM

## 2025-03-17 PROCEDURE — 92012 INTRM OPH EXAM EST PATIENT: CPT

## 2025-03-17 RX ORDER — TRIFLURIDINE 10 MG/ML: 1 SOLUTION OPHTHALMIC

## 2025-03-19 ENCOUNTER — FOLLOW UP (OUTPATIENT)
Age: 70
End: 2025-03-19

## 2025-03-19 DIAGNOSIS — B02.39: ICD-10-CM

## 2025-03-19 DIAGNOSIS — S05.02XD: ICD-10-CM

## 2025-03-19 PROCEDURE — 92012 INTRM OPH EXAM EST PATIENT: CPT

## 2025-03-19 RX ORDER — NEOMYCIN SULFATE, POLYMYXIN B SULFATE AND DEXAMETHASONE 3.5; 10000; 1 MG/G; [USP'U]/G; MG/G: OINTMENT OPHTHALMIC EVERY EVENING

## 2025-03-24 ENCOUNTER — FOLLOW UP (OUTPATIENT)
Age: 70
End: 2025-03-24

## 2025-03-24 DIAGNOSIS — B02.39: ICD-10-CM

## 2025-03-24 DIAGNOSIS — S05.02XD: ICD-10-CM

## 2025-03-24 PROCEDURE — 92012 INTRM OPH EXAM EST PATIENT: CPT

## 2025-04-25 ENCOUNTER — EMERGENCY VISIT (OUTPATIENT)
Age: 70
End: 2025-04-25

## 2025-04-25 DIAGNOSIS — B02.39: ICD-10-CM

## 2025-04-25 PROCEDURE — 92012 INTRM OPH EXAM EST PATIENT: CPT

## 2025-04-25 RX ORDER — NEOMYCIN SULFATE, POLYMYXIN B SULFATE AND DEXAMETHASONE 3.5; 10000; 1 MG/ML; [USP'U]/ML; MG/ML: 1 SUSPENSION OPHTHALMIC

## 2025-04-29 ENCOUNTER — FOLLOW UP (OUTPATIENT)
Age: 70
End: 2025-04-29

## 2025-04-29 DIAGNOSIS — B02.39: ICD-10-CM

## 2025-04-29 PROCEDURE — 92012 INTRM OPH EXAM EST PATIENT: CPT

## 2025-04-29 RX ORDER — BUSPIRONE HYDROCHLORIDE 10 MG/1: 1 TABLET ORAL

## 2025-05-05 ENCOUNTER — CONSULTATION/EVALUATION (OUTPATIENT)
Age: 70
End: 2025-05-05

## 2025-05-05 DIAGNOSIS — H25.9: ICD-10-CM

## 2025-05-05 DIAGNOSIS — B02.39: ICD-10-CM

## 2025-05-05 PROCEDURE — 99214 OFFICE O/P EST MOD 30 MIN: CPT

## 2025-05-05 PROCEDURE — 92025 CPTRIZED CORNEAL TOPOGRAPHY: CPT

## 2025-05-05 RX ORDER — PREDNISOLONE ACETATE 10 MG/ML: 1 SUSPENSION/ DROPS OPHTHALMIC

## 2025-05-05 RX ORDER — BUSPIRONE HYDROCHLORIDE 10 MG/1: 1 TABLET ORAL ONCE A DAY

## 2025-06-30 ENCOUNTER — PATIENT MESSAGE (OUTPATIENT)
Dept: PRIMARY CARE CLINIC | Facility: CLINIC | Age: 70
End: 2025-06-30

## 2025-07-02 ENCOUNTER — COMMUNITY OUTREACH (OUTPATIENT)
Dept: PRIMARY CARE CLINIC | Facility: CLINIC | Age: 70
End: 2025-07-02